# Patient Record
Sex: MALE | Race: BLACK OR AFRICAN AMERICAN | NOT HISPANIC OR LATINO | Employment: OTHER | URBAN - METROPOLITAN AREA
[De-identification: names, ages, dates, MRNs, and addresses within clinical notes are randomized per-mention and may not be internally consistent; named-entity substitution may affect disease eponyms.]

---

## 2020-02-05 DIAGNOSIS — M54.16 LUMBAR RADICULOPATHY: ICD-10-CM

## 2020-02-05 DIAGNOSIS — M54.12 CERVICAL RADICULOPATHY: Primary | ICD-10-CM

## 2020-03-09 ENCOUNTER — HOSPITAL ENCOUNTER (OUTPATIENT)
Dept: RADIOLOGY | Facility: HOSPITAL | Age: 75
Discharge: HOME | End: 2020-03-09
Attending: PHYSICIAN ASSISTANT
Payer: MEDICARE

## 2020-03-09 DIAGNOSIS — M54.12 CERVICAL RADICULOPATHY: ICD-10-CM

## 2020-03-09 DIAGNOSIS — M54.16 LUMBAR RADICULOPATHY: ICD-10-CM

## 2020-03-09 NOTE — PROGRESS NOTES
Pt reports having a penile implant but does not have a record with him to check compatible / [facility] contact [facility] and get [facility] medical infor and re-schedule MRI at later date

## 2020-03-09 NOTE — PROGRESS NOTES
Called pt at home and review MRI prep and reason for sedation / pt reports trouble laying flat and having sinus issues / advised NPO 4 hour / review meds and adult  present prior to test

## 2020-04-20 ENCOUNTER — HOSPITAL ENCOUNTER (OUTPATIENT)
Dept: RADIOLOGY | Facility: HOSPITAL | Age: 75
Discharge: HOME | End: 2020-04-20
Attending: PHYSICIAN ASSISTANT
Payer: MEDICARE

## 2020-06-11 NOTE — PROGRESS NOTES
Contacted patient, allergies reviewed, Pre/post MRI procedure instructions given to patient, patient understand to bring implant device ID card to MRI appointment. Medication/NPO status discussed,  Patient informed where to report accompanied by , answered patient questions, expressed understanding of given instructions.

## 2020-06-15 ENCOUNTER — HOSPITAL ENCOUNTER (OUTPATIENT)
Dept: RADIOLOGY | Facility: HOSPITAL | Age: 75
Discharge: HOME | End: 2020-06-15
Attending: PHYSICIAN ASSISTANT
Payer: COMMERCIAL

## 2020-06-15 VITALS
DIASTOLIC BLOOD PRESSURE: 74 MMHG | SYSTOLIC BLOOD PRESSURE: 169 MMHG | HEART RATE: 86 BPM | OXYGEN SATURATION: 100 % | RESPIRATION RATE: 20 BRPM

## 2020-06-15 RX ORDER — MIDAZOLAM HYDROCHLORIDE 1 MG/ML
2 INJECTION INTRAMUSCULAR; INTRAVENOUS ONCE
Status: DISCONTINUED | OUTPATIENT
Start: 2020-06-15 | End: 2020-06-16 | Stop reason: HOSPADM

## 2020-06-15 NOTE — PROGRESS NOTES
"Patient AAOX3, ambulatory with assistive walker, accompanied by daughter (Gris),  Verified 2 Pt. Identifier, pre-procedure instruction given to patient and patient's accompanied  (Daughter), patient states, (I use a BiPAP breathing machine at home, "My last MRI I was not able to lay down flat, I felt like I could not breath," "I have to be in an open MRI," instructed not to drive, make major decision, not to take sleep medication within 24 hours, expressed understanding,  verified allergies,  Midazolam 2mg will be  Given as ordered, patient will be monitored during  MRI exam.    "

## 2020-06-15 NOTE — PROGRESS NOTES
"Patient transferred to MRI scanning table, immediately requested to sit back up, patient, "I am not going to be able to lay down flat," "I have not been able to lay flat," patient refused to continue with the exam, "I'm not going to be able to do it." Instructed patient and daughter to communicate with his PCP for different course of actions. Patient states, "Yes, I will have to talk to my doctor." Patient D/C accompanied by daughter via W/C.   "

## 2023-04-26 ENCOUNTER — PROCEDURE VISIT (OUTPATIENT)
Dept: OPHTHALMOLOGY | Facility: CLINIC | Age: 78
End: 2023-04-26
Attending: OPHTHALMOLOGY
Payer: OTHER GOVERNMENT

## 2023-04-26 DIAGNOSIS — H25.813 MIXED TYPE AGE-RELATED CATARACT, BOTH EYES: ICD-10-CM

## 2023-04-26 DIAGNOSIS — H40.9 GLAUCOMA, UNSPECIFIED GLAUCOMA TYPE, UNSPECIFIED LATERALITY: Primary | ICD-10-CM

## 2023-04-26 DIAGNOSIS — E11.9 DIABETES MELLITUS TYPE 2 WITHOUT RETINOPATHY: ICD-10-CM

## 2023-04-26 PROCEDURE — 92004 PR EYE EXAM, NEW PATIENT,COMPREHESV: ICD-10-PCS | Mod: S$PBB,,, | Performed by: OPHTHALMOLOGY

## 2023-04-26 PROCEDURE — 92004 COMPRE OPH EXAM NEW PT 1/>: CPT | Mod: S$PBB,,, | Performed by: OPHTHALMOLOGY

## 2023-04-26 NOTE — PROGRESS NOTES
Subjective:       Patient ID: Dk Mcdaniels is a 78 y.o. male      Chief Complaint   Patient presents with    Referral     History of Present Illness  HPI    [facility]    CC: pt reports vision is blurry OU, getting a little worse over time.  Pt   reports that IOP has been high for about 20 years.  No pain.        Eye Meds: Liqu-Tears OU prn                      Alphagan OU  (TID)                     Cosopt OU ( BID)                     Latanaprost OU  (NIGHTLY)    POHx:   1. OHT OU  2. Glaucoma OU  3. H/o SLT OU per record review  Last edited by Cali Kenyon MD on 4/26/2023  5:50 PM.        Imaging:    See report    Assessment/Plan:     1. Glaucoma, unspecified glaucoma type, unspecified laterality  2. Mixed type age-related cataract, both eyes  [facility]  Refer to gl service next available  CPM with above gtts for now    3. Diabetes mellitus type 2 without retinopathy  BS control    Can return to retina clinic PRN    Follow up in about 3 weeks (around 5/17/2023), or if symptoms worsen or fail to improve, for Referral to glaucoma service.

## 2023-05-23 ENCOUNTER — OFFICE VISIT (OUTPATIENT)
Dept: OPHTHALMOLOGY | Facility: CLINIC | Age: 78
End: 2023-05-23
Payer: OTHER GOVERNMENT

## 2023-05-23 DIAGNOSIS — H40.1131 PRIMARY OPEN ANGLE GLAUCOMA (POAG) OF BOTH EYES, MILD STAGE: Primary | ICD-10-CM

## 2023-05-23 PROCEDURE — 99214 OFFICE O/P EST MOD 30 MIN: CPT | Mod: S$PBB,,, | Performed by: STUDENT IN AN ORGANIZED HEALTH CARE EDUCATION/TRAINING PROGRAM

## 2023-05-23 PROCEDURE — 92133 CPTRZD OPH DX IMG PST SGM ON: CPT | Mod: PBBFAC | Performed by: STUDENT IN AN ORGANIZED HEALTH CARE EDUCATION/TRAINING PROGRAM

## 2023-05-23 PROCEDURE — 92020 GONIOSCOPY: CPT | Mod: PBBFAC | Performed by: STUDENT IN AN ORGANIZED HEALTH CARE EDUCATION/TRAINING PROGRAM

## 2023-05-23 PROCEDURE — 76514 ECHO EXAM OF EYE THICKNESS: CPT | Mod: 26,S$PBB,, | Performed by: STUDENT IN AN ORGANIZED HEALTH CARE EDUCATION/TRAINING PROGRAM

## 2023-05-23 PROCEDURE — 92083 PR VISUAL FIELD EXAM,EXTENDED: ICD-10-PCS | Mod: 26,S$PBB,, | Performed by: STUDENT IN AN ORGANIZED HEALTH CARE EDUCATION/TRAINING PROGRAM

## 2023-05-23 PROCEDURE — 92020 PR SPECIAL EYE EVAL,GONIOSCOPY: ICD-10-PCS | Mod: S$PBB,,, | Performed by: STUDENT IN AN ORGANIZED HEALTH CARE EDUCATION/TRAINING PROGRAM

## 2023-05-23 PROCEDURE — 92083 EXTENDED VISUAL FIELD XM: CPT | Mod: 26,S$PBB,, | Performed by: STUDENT IN AN ORGANIZED HEALTH CARE EDUCATION/TRAINING PROGRAM

## 2023-05-23 PROCEDURE — 99999 PR PBB SHADOW E&M-EST. PATIENT-LVL III: ICD-10-PCS | Mod: PBBFAC,,, | Performed by: STUDENT IN AN ORGANIZED HEALTH CARE EDUCATION/TRAINING PROGRAM

## 2023-05-23 PROCEDURE — 99999 PR PBB SHADOW E&M-EST. PATIENT-LVL III: CPT | Mod: PBBFAC,,, | Performed by: STUDENT IN AN ORGANIZED HEALTH CARE EDUCATION/TRAINING PROGRAM

## 2023-05-23 PROCEDURE — 92083 EXTENDED VISUAL FIELD XM: CPT | Mod: PBBFAC | Performed by: STUDENT IN AN ORGANIZED HEALTH CARE EDUCATION/TRAINING PROGRAM

## 2023-05-23 PROCEDURE — 76514 PR  US, EYE, FOR CORNEAL THICKNESS: ICD-10-PCS | Mod: 26,S$PBB,, | Performed by: STUDENT IN AN ORGANIZED HEALTH CARE EDUCATION/TRAINING PROGRAM

## 2023-05-23 PROCEDURE — 99213 OFFICE O/P EST LOW 20 MIN: CPT | Mod: PBBFAC | Performed by: STUDENT IN AN ORGANIZED HEALTH CARE EDUCATION/TRAINING PROGRAM

## 2023-05-23 PROCEDURE — 92133 POSTERIOR SEGMENT OCT OPTIC NERVE(OCULAR COHERENCE TOMOGRAPHY) - OU - BOTH EYES: ICD-10-PCS | Mod: 26,S$PBB,, | Performed by: STUDENT IN AN ORGANIZED HEALTH CARE EDUCATION/TRAINING PROGRAM

## 2023-05-23 PROCEDURE — 76514 ECHO EXAM OF EYE THICKNESS: CPT | Mod: PBBFAC | Performed by: STUDENT IN AN ORGANIZED HEALTH CARE EDUCATION/TRAINING PROGRAM

## 2023-05-23 PROCEDURE — 99214 PR OFFICE/OUTPT VISIT, EST, LEVL IV, 30-39 MIN: ICD-10-PCS | Mod: S$PBB,,, | Performed by: STUDENT IN AN ORGANIZED HEALTH CARE EDUCATION/TRAINING PROGRAM

## 2023-05-23 PROCEDURE — 92020 GONIOSCOPY: CPT | Mod: S$PBB,,, | Performed by: STUDENT IN AN ORGANIZED HEALTH CARE EDUCATION/TRAINING PROGRAM

## 2023-05-23 NOTE — PROGRESS NOTES
Subjective:  HPI    77 yo male here for glaucoma eval     Ref. Dr. Kenoyn     C/o: Pt states his vision is foggy for distance.  Stops reading after a   while due to letters running together.     Meds: Alphagan TID OU              Cosopt BID OU              Latanoprost QHS OU      Last edited by Estella Torres MA on 5/23/2023 10:55 AM.      Exam:  Base Eye Exam       Visual Acuity (Snellen - Linear)         Right Left    Dist cc 20/40 20/40      Correction: Glasses              Tonometry (Applanation, 11:10 AM)         Right Left    Pressure 24 26              Pachymetry (5/23/2023)         Right Left    Thickness 563 577              Gonioscopy (Khushbu)         Right Left    Temporal SS SS    Nasal SS SS    Superior SS SS    Inferior SS, focal PAS SS, few PAS              Pupils         Pupils Dark Light Shape React APD    Right PERRL 4 3 Round Brisk None    Left PERRL 4 3 Round Brisk None              Visual Fields         Right Left     Full Full              Neuro/Psych       Oriented x3: Yes    Mood/Affect: Normal              Dilation       Both eyes: 1% Mydriacyl, 2.5% Phenylephrine @ 11:25AM                  Slit Lamp and Fundus Exam       External Exam         Right Left    External Normal Normal              Slit Lamp Exam         Right Left    Lids/Lashes Normal Normal    Conjunctiva/Sclera White and quiet White and quiet    Cornea Arcus Arcus    Anterior Chamber Deep and quiet Deep and quiet    Iris Round and reactive, dilates 5.5mm Round and reactive, dilates 5.5mm    Lens 2+ Nuclear sclerosis, 1+ Cortical cataract, 1+ Posterior subcapsular cataract 2+ Nuclear sclerosis, 1+ Cortical cataract, 1+ Posterior subcapsular cataract    Anterior Vitreous Posterior vitreous detachment Vitreous syneresis              Fundus Exam         Right Left    Disc Normal, full rim Normal, full rim    C/D Ratio 0.6 0.6    Macula Normal Normal, inferior myelinated NFL    Vessels Normal Normal    Periphery Normal Normal                   Refraction       Manifest Refraction         Sphere Cylinder Axis Dist [facility]    Right +0.75 +0.25 171 20/40    Left +0.50 +1.00 043 20/NI                  Assessment:  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 Per referral note, recent IOP OS 37. PMH hx prostate cancer, CML, HTN.  - Surg hx: none   - Laser hx: SLT x 2 OU  - Glaucoma FHx: none  -  / 577  - Gonio: open/ with inferior PAS OU (Estefani 5/2023)  - Tmax: unknown OD, 37 per outside records OS  - Target IOP: not yet determined  - Med adverse effects: n/a    Baseline HVF 5/2023 -- VFI 97/95  Baseline RNFL 5/2023 -- avg 79/85  Baseline photos pending    05/23/2023  IOP 24/26 on Cosopt 2/2, Brim 3/3, Xal 1/1  HVF: reliable, early SNS, VFI 97 OD  borderline reliable, nasal step, superior depressed points, VFI 95 OS -- baseline  OCT RNFL: blunted ST/IT peak, avg 79 OD  early IT thinning, avg 85 OS -- baseline    Cataracts, both eyes  Patient is interested in cataract surgery; interfering with activities of daily living. Visually significant cataract causing significant decrease in quality of life.  PAP 20/40 OU  - Guttae, PXF, or phacodonesis: none  - H/o LASIK/PRK, RK, or retinal surgery: none  - Dilation: poor (5.5mm)  - Target: plano  - Astigmatism: not yet determined  - Special needs: Trypan; No blood thinners    Plan:  OcHTN vs mild POAG OU. Thick pachymetry with IOP 24/26 on MMT. Proceed with phaco/IOL/OMNI superior 180/180 OS then OD.  - Continue Cosopt 2/2, Brim 3/3, Xal 1/1    Return for IOL calcs; Lenstar/Pentacam OU 1-2 weeks  Schedule phaco/IOL/OMNI OS     Bibi Jara MD  [facility], Glaucoma

## 2023-05-29 ENCOUNTER — TELEPHONE (OUTPATIENT)
Dept: OPHTHALMOLOGY | Facility: CLINIC | Age: 78
End: 2023-05-29
Payer: OTHER GOVERNMENT

## 2023-05-29 DIAGNOSIS — H25.813 MIXED TYPE AGE-RELATED CATARACT, BOTH EYES: Primary | ICD-10-CM

## 2023-06-23 ENCOUNTER — OFFICE VISIT (OUTPATIENT)
Dept: OPHTHALMOLOGY | Facility: CLINIC | Age: 78
End: 2023-06-23
Payer: OTHER GOVERNMENT

## 2023-06-23 DIAGNOSIS — H40.1131 PRIMARY OPEN ANGLE GLAUCOMA (POAG) OF BOTH EYES, MILD STAGE: ICD-10-CM

## 2023-06-23 DIAGNOSIS — H25.813 MIXED TYPE AGE-RELATED CATARACT, BOTH EYES: Primary | ICD-10-CM

## 2023-06-23 PROCEDURE — 99999 PR PBB SHADOW E&M-EST. PATIENT-LVL II: ICD-10-PCS | Mod: PBBFAC,,, | Performed by: STUDENT IN AN ORGANIZED HEALTH CARE EDUCATION/TRAINING PROGRAM

## 2023-06-23 PROCEDURE — 99213 OFFICE O/P EST LOW 20 MIN: CPT | Mod: S$PBB,,, | Performed by: STUDENT IN AN ORGANIZED HEALTH CARE EDUCATION/TRAINING PROGRAM

## 2023-06-23 PROCEDURE — 99999 PR PBB SHADOW E&M-EST. PATIENT-LVL II: CPT | Mod: PBBFAC,,, | Performed by: STUDENT IN AN ORGANIZED HEALTH CARE EDUCATION/TRAINING PROGRAM

## 2023-06-23 PROCEDURE — 99213 PR OFFICE/OUTPT VISIT, EST, LEVL III, 20-29 MIN: ICD-10-PCS | Mod: S$PBB,,, | Performed by: STUDENT IN AN ORGANIZED HEALTH CARE EDUCATION/TRAINING PROGRAM

## 2023-06-23 PROCEDURE — 99212 OFFICE O/P EST SF 10 MIN: CPT | Mod: PBBFAC | Performed by: STUDENT IN AN ORGANIZED HEALTH CARE EDUCATION/TRAINING PROGRAM

## 2023-06-23 RX ORDER — MOXIFLOXACIN 5 MG/ML
SOLUTION/ DROPS OPHTHALMIC
Qty: 3 ML | Refills: 0 | Status: SHIPPED | OUTPATIENT
Start: 2023-06-23 | End: 2023-06-23

## 2023-06-23 RX ORDER — MOXIFLOXACIN 5 MG/ML
SOLUTION/ DROPS OPHTHALMIC
Qty: 3 ML | Refills: 0 | OUTPATIENT
Start: 2023-06-23 | End: 2023-06-23

## 2023-06-23 RX ORDER — KETOROLAC TROMETHAMINE 5 MG/ML
1 SOLUTION OPHTHALMIC 4 TIMES DAILY
Qty: 5 ML | Refills: 1 | OUTPATIENT
Start: 2023-06-23 | End: 2023-06-23

## 2023-06-23 RX ORDER — KETOROLAC TROMETHAMINE 5 MG/ML
1 SOLUTION OPHTHALMIC 4 TIMES DAILY
Qty: 5 ML | Refills: 1 | Status: SHIPPED | OUTPATIENT
Start: 2023-06-23 | End: 2023-06-23

## 2023-06-23 RX ORDER — PREDNISOLONE ACETATE 10 MG/ML
1 SUSPENSION/ DROPS OPHTHALMIC 4 TIMES DAILY
Qty: 5 ML | Refills: 1 | OUTPATIENT
Start: 2023-06-23 | End: 2023-06-23

## 2023-06-23 RX ORDER — PREDNISOLONE ACETATE 10 MG/ML
1 SUSPENSION/ DROPS OPHTHALMIC 4 TIMES DAILY
Qty: 5 ML | Refills: 1 | OUTPATIENT
Start: 2023-06-23 | End: 2023-11-21

## 2023-06-23 RX ORDER — PREDNISOLONE ACETATE 10 MG/ML
1 SUSPENSION/ DROPS OPHTHALMIC 4 TIMES DAILY
Qty: 5 ML | Refills: 1 | Status: SHIPPED | OUTPATIENT
Start: 2023-06-23 | End: 2023-06-23

## 2023-06-23 RX ORDER — KETOROLAC TROMETHAMINE 5 MG/ML
1 SOLUTION OPHTHALMIC 4 TIMES DAILY
Qty: 5 ML | Refills: 1 | OUTPATIENT
Start: 2023-06-23

## 2023-06-23 RX ORDER — MOXIFLOXACIN 5 MG/ML
SOLUTION/ DROPS OPHTHALMIC
Qty: 3 ML | Refills: 0 | OUTPATIENT
Start: 2023-06-23

## 2023-06-23 NOTE — PROGRESS NOTES
Subjective:  HPI     Cataract     Additional comments: Pt here for IOL calculations and surgery scheduling   only. Pt seen in clinic 05/23/2023.           Comments    Pt here for IOL calculations and surgery scheduling only. Pt seen in   clinic 05/23/2023.    POHx:  1. 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 Cataracts, both eyes          Last edited by Jeanie Beth on 6/23/2023 11:14 AM.      Exam:  Base Eye Exam       Neuro/Psych       Oriented x3: Yes    Mood/Affect: Normal                  Slit Lamp and Fundus Exam       External Exam         Right Left    External Normal Normal              Slit Lamp Exam         Right Left    Lids/Lashes Normal Normal    Conjunctiva/Sclera White and quiet White and quiet    Cornea Arcus Arcus    Anterior Chamber Deep and quiet Deep and quiet    Iris Round and reactive, dilates 5.5mm Round and reactive, dilates 5.5mm    Lens 2+ Nuclear sclerosis, 1+ Cortical cataract, 1+ Posterior subcapsular cataract 2+ Nuclear sclerosis, 1+ Cortical cataract, 1+ Posterior subcapsular cataract    Anterior Vitreous Posterior vitreous detachment Vitreous syneresis              Fundus Exam         Right Left    Disc Normal, full rim Normal, full rim    C/D Ratio 0.6 0.6                  Assessment:  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 Per referral note, recent IOP OS 37. PMH hx prostate cancer, CML, HTN.  - Surg hx: none   - Laser hx: SLT x 2 OU  - Glaucoma FHx: none  -  / 577  - Gonio: open/ with inferior PAS OU (Coulon 5/2023)  - Tmax: unknown OD, 37 per outside records OS  - Target IOP: not yet determined  - Med adverse effects: n/a    Baseline HVF 5/2023 -- VFI 97/95  Baseline RNFL 5/2023 -- avg 79/85  Baseline photos pending    5/23/23 LCV  IOP 24/26 on Cosopt 2/2, Brim 3/3, Xal 1/1  HVF: reliable, early SNS, VFI 97 OD  borderline reliable, nasal step, superior depressed points, VFI 95 OS -- baseline  OCT RNFL:  blunted ST/IT peak, avg 79 OD  early IT thinning, avg 85 OS -- baseline    Cataracts, both eyes  Patient is interested in cataract surgery; interfering with activities of daily living. Visually significant cataract causing significant decrease in quality of life.  PAP 20/40 OU  - Guttae, PXF, or phacodonesis: none  - H/o LASIK/PRK, RK, or retinal surgery: none  - Dilation: poor (5.5mm)  - Target: plano  - Astigmatism: 1.4D OD, 1.92D OS; Pentacam with no astigmatism OD/irregular OS, discussed, declines toric; understands need for glasses OU  - Special needs: Trypan, Malyugin; No blood thinners  - Lens choice: OD DIBOO 19.5, MA60AC 19.5/18.5; OS DIBOO 19.5, MA60AC 19.5/18.5    Plan:  OcHTN vs mild POAG OU. Thick pachymetry with IOP 24/26 on MMT. Proceed with phaco/IOL/OMNI superior 180/180 OS then OD.  - Continue Cosopt 2/2, Brim 3/3, Xal 1/1    Return OR 7/20/23 -- phaco/IOL/OMNI superior 180/180 OS  Lens choices: DIBOO 19.5, MA60AC 19.5/18.5  Special needs: Trypan, Malyugin  [facility]    Bibi Jara MD  [facility], Glaucoma

## 2023-07-19 ENCOUNTER — TELEPHONE (OUTPATIENT)
Dept: OPHTHALMOLOGY | Facility: CLINIC | Age: 78
End: 2023-07-19
Payer: OTHER GOVERNMENT

## 2023-07-19 RX ORDER — LOSARTAN POTASSIUM 100 MG/1
1 TABLET ORAL DAILY
COMMUNITY
Start: 2023-03-13

## 2023-07-19 RX ORDER — ALLOPURINOL 100 MG/1
50 TABLET ORAL NIGHTLY
COMMUNITY
Start: 2023-03-13

## 2023-07-19 RX ORDER — SEMAGLUTIDE 1.34 MG/ML
1 INJECTION, SOLUTION SUBCUTANEOUS
COMMUNITY

## 2023-07-19 RX ORDER — SENNOSIDES 8.6 MG/1
8.6 TABLET ORAL
COMMUNITY
Start: 2023-06-07

## 2023-07-19 RX ORDER — CARVEDILOL 25 MG/1
1 TABLET ORAL 2 TIMES DAILY
COMMUNITY
Start: 2023-03-13

## 2023-07-19 RX ORDER — HYDRALAZINE HYDROCHLORIDE 50 MG/1
50 TABLET, FILM COATED ORAL EVERY 12 HOURS
COMMUNITY
Start: 2022-09-29

## 2023-07-19 RX ORDER — CHLORTHALIDONE 25 MG/1
25 TABLET ORAL DAILY
COMMUNITY
Start: 2023-03-13

## 2023-07-19 RX ORDER — LANOLIN ALCOHOL/MO/W.PET/CERES
400 CREAM (GRAM) TOPICAL
COMMUNITY
Start: 2023-03-13

## 2023-07-19 RX ORDER — GABAPENTIN 300 MG/1
1 CAPSULE ORAL 2 TIMES DAILY
COMMUNITY
Start: 2023-03-13

## 2023-07-19 NOTE — PRE-PROCEDURE INSTRUCTIONS
PreOp Instructions given:   - Verbal medication information (what to hold and what to take)   - NPO guidelines   - Arrival place directions given; time to be given the day before procedure by the   Surgeon's Office [address] [facility] [place]  - Bathing with antibacterial soap   - Don't wear any jewelry or bring any valuables AM of surgery   - No makeup or moisturizer to face   - No perfume/cologne, powder, lotions or aftershave   Pt. verbalized understanding.   Pt denies any h/o Anesthesia/Sedation complications or side effects.

## 2023-07-20 ENCOUNTER — ANESTHESIA (OUTPATIENT)
Dept: SURGERY | Facility: HOSPITAL | Age: 78
End: 2023-07-20
Payer: OTHER GOVERNMENT

## 2023-07-20 ENCOUNTER — HOSPITAL ENCOUNTER (OUTPATIENT)
Facility: HOSPITAL | Age: 78
Discharge: HOME | End: 2023-07-20
Attending: STUDENT IN AN ORGANIZED HEALTH CARE EDUCATION/TRAINING PROGRAM | Admitting: STUDENT IN AN ORGANIZED HEALTH CARE EDUCATION/TRAINING PROGRAM
Payer: OTHER GOVERNMENT

## 2023-07-20 ENCOUNTER — ANESTHESIA EVENT (OUTPATIENT)
Dept: SURGERY | Facility: HOSPITAL | Age: 78
End: 2023-07-20
Payer: OTHER GOVERNMENT

## 2023-07-20 VITALS
RESPIRATION RATE: 17 BRPM | HEART RATE: 80 BPM | SYSTOLIC BLOOD PRESSURE: 110 MMHG | WEIGHT: 229 LBS | OXYGEN SATURATION: 99 % | TEMPERATURE: 98 F | DIASTOLIC BLOOD PRESSURE: 64 MMHG | BODY MASS INDEX: 36.96 KG/M2

## 2023-07-20 DIAGNOSIS — H40.1131 PRIMARY OPEN ANGLE GLAUCOMA (POAG) OF BOTH EYES, MILD STAGE: ICD-10-CM

## 2023-07-20 DIAGNOSIS — H25.811 COMBINED FORMS OF AGE-RELATED CATARACT OF RIGHT EYE: ICD-10-CM

## 2023-07-20 DIAGNOSIS — H25.812 COMBINED FORM OF AGE-RELATED CATARACT, LEFT EYE: Primary | ICD-10-CM

## 2023-07-20 DIAGNOSIS — H25.813 MIXED TYPE AGE-RELATED CATARACT, BOTH EYES: ICD-10-CM

## 2023-07-20 LAB
POCT GLUCOSE: 109 MG/DL (ref 70–110)
POCT GLUCOSE: 114 MG/DL (ref 70–110)

## 2023-07-20 PROCEDURE — 71000044 HC DOSC ROUTINE RECOVERY FIRST HOUR: Performed by: STUDENT IN AN ORGANIZED HEALTH CARE EDUCATION/TRAINING PROGRAM

## 2023-07-20 PROCEDURE — 66174 TRLUML DIL AQ O/F CAN W/O ST: CPT | Mod: 53,51,LT, | Performed by: STUDENT IN AN ORGANIZED HEALTH CARE EDUCATION/TRAINING PROGRAM

## 2023-07-20 PROCEDURE — 36000708 HC OR TIME LEV III 1ST 15 MIN: Performed by: STUDENT IN AN ORGANIZED HEALTH CARE EDUCATION/TRAINING PROGRAM

## 2023-07-20 PROCEDURE — 25000003 PHARM REV CODE 250: Performed by: STUDENT IN AN ORGANIZED HEALTH CARE EDUCATION/TRAINING PROGRAM

## 2023-07-20 PROCEDURE — 82962 GLUCOSE BLOOD TEST: CPT | Performed by: STUDENT IN AN ORGANIZED HEALTH CARE EDUCATION/TRAINING PROGRAM

## 2023-07-20 PROCEDURE — 66982 PR REMOVAL, CATARACT, W/INSRT INTRAOC LENS, W/O ENDO CYCLO, CMPLX: ICD-10-PCS | Mod: LT,,, | Performed by: STUDENT IN AN ORGANIZED HEALTH CARE EDUCATION/TRAINING PROGRAM

## 2023-07-20 PROCEDURE — V2632 POST CHMBR INTRAOCULAR LENS: HCPCS | Performed by: STUDENT IN AN ORGANIZED HEALTH CARE EDUCATION/TRAINING PROGRAM

## 2023-07-20 PROCEDURE — 65820 GONIOTOMY: CPT | Mod: 59,LT,, | Performed by: STUDENT IN AN ORGANIZED HEALTH CARE EDUCATION/TRAINING PROGRAM

## 2023-07-20 PROCEDURE — 66982 XCAPSL CTRC RMVL CPLX WO ECP: CPT | Mod: LT,,, | Performed by: STUDENT IN AN ORGANIZED HEALTH CARE EDUCATION/TRAINING PROGRAM

## 2023-07-20 PROCEDURE — 37000008 HC ANESTHESIA 1ST 15 MINUTES: Performed by: STUDENT IN AN ORGANIZED HEALTH CARE EDUCATION/TRAINING PROGRAM

## 2023-07-20 PROCEDURE — D9220A PRA ANESTHESIA: Mod: ANES,,, | Performed by: ANESTHESIOLOGY

## 2023-07-20 PROCEDURE — D9220A PRA ANESTHESIA: Mod: CRNA,,, | Performed by: NURSE ANESTHETIST, CERTIFIED REGISTERED

## 2023-07-20 PROCEDURE — 25000003 PHARM REV CODE 250: Performed by: NURSE ANESTHETIST, CERTIFIED REGISTERED

## 2023-07-20 PROCEDURE — D9220A PRA ANESTHESIA: ICD-10-PCS | Mod: CRNA,,, | Performed by: NURSE ANESTHETIST, CERTIFIED REGISTERED

## 2023-07-20 PROCEDURE — 65820 PR RELIEVE INNER EYE PRESSURE: ICD-10-PCS | Mod: 59,LT,, | Performed by: STUDENT IN AN ORGANIZED HEALTH CARE EDUCATION/TRAINING PROGRAM

## 2023-07-20 PROCEDURE — 66174 PR TRANSLUMINAL DILATION AQUEOUS CANAL, W/O RETENTION DEVICE/STENT: ICD-10-PCS | Mod: 53,51,LT, | Performed by: STUDENT IN AN ORGANIZED HEALTH CARE EDUCATION/TRAINING PROGRAM

## 2023-07-20 PROCEDURE — 37000009 HC ANESTHESIA EA ADD 15 MINS: Performed by: STUDENT IN AN ORGANIZED HEALTH CARE EDUCATION/TRAINING PROGRAM

## 2023-07-20 PROCEDURE — D9220A PRA ANESTHESIA: ICD-10-PCS | Mod: ANES,,, | Performed by: ANESTHESIOLOGY

## 2023-07-20 PROCEDURE — 36000709 HC OR TIME LEV III EA ADD 15 MIN: Performed by: STUDENT IN AN ORGANIZED HEALTH CARE EDUCATION/TRAINING PROGRAM

## 2023-07-20 PROCEDURE — 71000015 HC POSTOP RECOV 1ST HR: Performed by: STUDENT IN AN ORGANIZED HEALTH CARE EDUCATION/TRAINING PROGRAM

## 2023-07-20 PROCEDURE — C1876 STENT, NON-COA/NON-COV W/DEL: HCPCS | Performed by: STUDENT IN AN ORGANIZED HEALTH CARE EDUCATION/TRAINING PROGRAM

## 2023-07-20 PROCEDURE — 63600175 PHARM REV CODE 636 W HCPCS: Performed by: NURSE ANESTHETIST, CERTIFIED REGISTERED

## 2023-07-20 PROCEDURE — 25000003 PHARM REV CODE 250

## 2023-07-20 DEVICE — LENS EYHANCE +19.5D: Type: IMPLANTABLE DEVICE | Site: EYE | Status: FUNCTIONAL

## 2023-07-20 RX ORDER — LIDOCAINE HYDROCHLORIDE 40 MG/ML
INJECTION, SOLUTION RETROBULBAR
Status: DISCONTINUED
Start: 2023-07-20 | End: 2023-07-20 | Stop reason: HOSPADM

## 2023-07-20 RX ORDER — ONDANSETRON 2 MG/ML
4 INJECTION INTRAMUSCULAR; INTRAVENOUS DAILY PRN
Status: DISCONTINUED | OUTPATIENT
Start: 2023-07-20 | End: 2023-07-20 | Stop reason: HOSPADM

## 2023-07-20 RX ORDER — MIDAZOLAM HYDROCHLORIDE 1 MG/ML
INJECTION, SOLUTION INTRAMUSCULAR; INTRAVENOUS
Status: DISCONTINUED | OUTPATIENT
Start: 2023-07-20 | End: 2023-07-20

## 2023-07-20 RX ORDER — HALOPERIDOL 5 MG/ML
0.5 INJECTION INTRAMUSCULAR EVERY 10 MIN PRN
Status: DISCONTINUED | OUTPATIENT
Start: 2023-07-20 | End: 2023-07-20 | Stop reason: HOSPADM

## 2023-07-20 RX ORDER — SODIUM CHLORIDE 0.9 % (FLUSH) 0.9 %
2 SYRINGE (ML) INJECTION
Status: DISCONTINUED | OUTPATIENT
Start: 2023-07-20 | End: 2023-07-20 | Stop reason: HOSPADM

## 2023-07-20 RX ORDER — MOXIFLOXACIN 5 MG/ML
SOLUTION/ DROPS OPHTHALMIC
Status: COMPLETED
Start: 2023-07-20 | End: 2023-07-20

## 2023-07-20 RX ORDER — PREDNISOLONE ACETATE 10 MG/ML
1 SUSPENSION/ DROPS OPHTHALMIC
Status: DISCONTINUED | OUTPATIENT
Start: 2023-07-20 | End: 2023-07-20 | Stop reason: HOSPADM

## 2023-07-20 RX ORDER — OXYCODONE AND ACETAMINOPHEN 5; 325 MG/1; MG/1
1 TABLET ORAL
Status: DISCONTINUED | OUTPATIENT
Start: 2023-07-20 | End: 2023-07-20 | Stop reason: HOSPADM

## 2023-07-20 RX ORDER — PREDNISOLONE ACETATE 10 MG/ML
SUSPENSION/ DROPS OPHTHALMIC
Status: COMPLETED
Start: 2023-07-20 | End: 2023-07-20

## 2023-07-20 RX ORDER — FENTANYL CITRATE 50 UG/ML
INJECTION, SOLUTION INTRAMUSCULAR; INTRAVENOUS
Status: DISCONTINUED | OUTPATIENT
Start: 2023-07-20 | End: 2023-07-20

## 2023-07-20 RX ORDER — SODIUM CHLORIDE 0.9 % (FLUSH) 0.9 %
3 SYRINGE (ML) INJECTION
Status: DISCONTINUED | OUTPATIENT
Start: 2023-07-20 | End: 2023-07-20 | Stop reason: HOSPADM

## 2023-07-20 RX ORDER — LIDOCAINE HYDROCHLORIDE 10 MG/ML
INJECTION, SOLUTION EPIDURAL; INFILTRATION; INTRACAUDAL; PERINEURAL
Status: DISCONTINUED
Start: 2023-07-20 | End: 2023-07-20 | Stop reason: HOSPADM

## 2023-07-20 RX ORDER — MOXIFLOXACIN 5 MG/ML
1 SOLUTION/ DROPS OPHTHALMIC
Status: DISCONTINUED | OUTPATIENT
Start: 2023-07-20 | End: 2023-07-20 | Stop reason: HOSPADM

## 2023-07-20 RX ORDER — CYCLOP/TROP/PROPA/PHEN/KET/WAT 1-1-0.1%
1 DROPS (EA) OPHTHALMIC (EYE)
Status: DISCONTINUED | OUTPATIENT
Start: 2023-07-20 | End: 2023-07-20 | Stop reason: HOSPADM

## 2023-07-20 RX ORDER — LIDOCAINE HYDROCHLORIDE 40 MG/ML
INJECTION, SOLUTION RETROBULBAR
Status: DISCONTINUED | OUTPATIENT
Start: 2023-07-20 | End: 2023-07-20 | Stop reason: HOSPADM

## 2023-07-20 RX ORDER — DEXAMETHASONE SODIUM PHOSPHATE 4 MG/ML
INJECTION, SOLUTION INTRA-ARTICULAR; INTRALESIONAL; INTRAMUSCULAR; INTRAVENOUS; SOFT TISSUE
Status: DISCONTINUED
Start: 2023-07-20 | End: 2023-07-20 | Stop reason: HOSPADM

## 2023-07-20 RX ORDER — HYDROMORPHONE HYDROCHLORIDE 1 MG/ML
0.2 INJECTION, SOLUTION INTRAMUSCULAR; INTRAVENOUS; SUBCUTANEOUS EVERY 5 MIN PRN
Status: DISCONTINUED | OUTPATIENT
Start: 2023-07-20 | End: 2023-07-20 | Stop reason: HOSPADM

## 2023-07-20 RX ORDER — CEFAZOLIN SODIUM 500 MG/2.2ML
INJECTION, POWDER, FOR SOLUTION INTRAMUSCULAR; INTRAVENOUS
Status: DISCONTINUED
Start: 2023-07-20 | End: 2023-07-20 | Stop reason: HOSPADM

## 2023-07-20 RX ORDER — LIDOCAINE HYDROCHLORIDE 10 MG/ML
INJECTION, SOLUTION EPIDURAL; INFILTRATION; INTRACAUDAL; PERINEURAL
Status: DISCONTINUED | OUTPATIENT
Start: 2023-07-20 | End: 2023-07-20 | Stop reason: HOSPADM

## 2023-07-20 RX ADMIN — MOXIFLOXACIN OPHTHALMIC 1 DROP: 5 SOLUTION/ DROPS OPHTHALMIC at 06:07

## 2023-07-20 RX ADMIN — Medication 1 DROP: at 06:07

## 2023-07-20 RX ADMIN — MIDAZOLAM HYDROCHLORIDE 2 MG: 1 INJECTION, SOLUTION INTRAMUSCULAR; INTRAVENOUS at 07:07

## 2023-07-20 RX ADMIN — Medication 1 DROP: at 07:07

## 2023-07-20 RX ADMIN — FENTANYL CITRATE 25 MCG: 50 INJECTION, SOLUTION INTRAMUSCULAR; INTRAVENOUS at 07:07

## 2023-07-20 RX ADMIN — PREDNISOLONE ACETATE 1 DROP: 10 SUSPENSION/ DROPS OPHTHALMIC at 06:07

## 2023-07-20 RX ADMIN — MOXIFLOXACIN OPHTHALMIC 1 DROP: 5 SOLUTION/ DROPS OPHTHALMIC at 07:07

## 2023-07-20 RX ADMIN — PREDNISOLONE ACETATE 1 DROP: 10 SUSPENSION/ DROPS OPHTHALMIC at 07:07

## 2023-07-20 RX ADMIN — SODIUM CHLORIDE: 0.9 INJECTION, SOLUTION INTRAVENOUS at 06:07

## 2023-07-20 NOTE — INTERVAL H&P NOTE
BRIEF HISTORY, PERTINENT EXAM:  H&P reviewed. No changes.    ASSESSMENT/PLAN:  Proceed with surgery as planned -- phaco/IOL/possible OMNI vs KDB OS    Bibi Jara MD

## 2023-07-20 NOTE — DISCHARGE SUMMARY
[facility] - [facility] (1st Fl)  Discharge Note  Short Stay    Procedure(s) (LRB):  PHACOEMULSIFICATION, CATARACT (Left)  INSERTION, IOL PROSTHESIS (Left)  GONIOTOMY (Left)    OUTCOME: Patient tolerated treatment/procedure well without complication and is now ready for discharge.    DISPOSITION: Home or Self Care    FINAL DIAGNOSIS:  <principal problem not specified>    FOLLOWUP: In clinic    DISCHARGE INSTRUCTIONS:  No discharge procedures on file.     TIME SPENT ON DISCHARGE: 5 minutes

## 2023-07-20 NOTE — ANESTHESIA POSTPROCEDURE EVALUATION
Anesthesia Post Evaluation    Patient: Dk Mcdaniels    Procedure(s) Performed: Procedure(s) (LRB):  PHACOEMULSIFICATION, CATARACT (Left)  INSERTION, IOL PROSTHESIS (Left)  GONIOTOMY (Left)    Final Anesthesia Type: MAC      Patient location during evaluation: PACU  Patient participation: Yes- Able to Participate  Level of consciousness: awake and alert and oriented  Post-procedure vital signs: reviewed and stable  Pain management: adequate  Airway patency: patent    PONV status at discharge: No PONV  Anesthetic complications: no      Cardiovascular status: stable  Respiratory status: unassisted, spontaneous ventilation and room air  Hydration status: euvolemic  Follow-up not needed.          Vitals Value Taken Time   /64 07/20/23 0901   Temp 36.6 °C (97.9 °F) 07/20/23 0900   Pulse 84 07/20/23 0903   Resp 0 07/20/23 0903   SpO2 100 % 07/20/23 0903   Vitals shown include unvalidated device data.      No case tracking events are documented in the log.      Pain/Cory Score: Cory Score: 10 (7/20/2023  8:15 AM)

## 2023-07-20 NOTE — ANESTHESIA PREPROCEDURE EVALUATION
"                                                                                                             07/20/2023  Dk Mcdaniels is a 78 y.o., male.    Procedures:        PHACOEMULSIFICATION, CATARACT (Left: Eye)       INSERTION, IOL PROSTHESIS (Left: Eye)       GONIOTOMY (Left: Eye)   Anesthesia type: Local MAC   Diagnosis: Mixed type age-related cataract, both eyes [H25.813]         Pre-operative evaluation for Procedure(s) (LRB):  PHACOEMULSIFICATION, CATARACT (Left)  INSERTION, IOL PROSTHESIS (Left)  GONIOTOMY (Left)    @[id number]@@    No diagnosis found.    Review of patient's allergies indicates:   Allergen Reactions    Metoprolol Shortness Of Breath and Other (See Comments)     Chest pains    Lisinopril Other (See Comments)     Coughing a lot after taking Lisinopril Med    Pcn [penicillins] Itching     " i break out from head to toe "       Medications Prior to Admission   Medication Sig Dispense Refill Last Dose    allopurinoL (ZYLOPRIM) 100 MG tablet Take 50 mg by mouth every evening.       amlodipine (NORVASC) 10 MG tablet Take 10 mg by mouth once daily.   7/19/2023    carvediloL (COREG) 25 MG tablet Take 1 tablet by mouth 2 (two) times daily.   7/19/2023    chlorthalidone (HYGROTEN) 25 MG Tab Take 25 mg by mouth once daily.       empagliflozin (JARDIANCE) 25 mg tablet Take 1 tablet by mouth every morning.   7/19/2023    gabapentin (NEURONTIN) 300 MG capsule Take 1 capsule by mouth 2 (two) times daily. Takes 300 mg every AM and 600 mg every PM   7/19/2023    hydrALAZINE (APRESOLINE) 50 MG tablet Take 50 mg by mouth every 12 (twelve) hours.       losartan (COZAAR) 100 MG tablet Take 1 tablet by mouth once daily.       magnesium oxide (MAG-OX) 400 mg (241.3 mg magnesium) tablet 400 mg.       omeprazole (PRILOSEC) 20 MG capsule Take 20 mg by mouth once daily.   7/19/2023    senna (SENOKOT) 8.6 mg tablet 8.6 mg.       aspirin (ECOTRIN) 81 MG EC tablet Take 81 mg by mouth once daily.       " brimonidine 0.2% (ALPHAGAN) 0.2 % Drop 1 drop every 8 (eight) hours.       dorzolamide (TRUSOPT) 2 % ophthalmic solution 1 drop 3 (three) times daily.       FOLIC ACID/MULTIVITS-MIN/LUT (CENTRUM SILVER ORAL) Take by mouth.       imatinib (GLEEVEC) 100 MG Tab Take by mouth once daily.       ketorolac 0.5% (ACULAR) 0.5 % Drop Place 1 drop into the left eye 4 (four) times daily. 5 mL 1     loratadine (CLARITIN) 10 mg tablet Take 10 mg by mouth once daily.       methocarbamol (ROBAXIN) 500 MG Tab Take 500 mg by mouth 4 (four) times daily.       moxifloxacin (VIGAMOX) 0.5 % ophthalmic solution 1 drop 4 times daily in the left eye for 10 total days after surgery. 3 mL 0     naproxen (NAPROSYN) 500 MG tablet Take 500 mg by mouth 2 (two) times daily with meals.       prednisoLONE acetate (PRED FORTE) 1 % DrpS Place 1 drop into the left eye 4 (four) times daily. 5 mL 1     semaglutide (OZEMPIC) 1 mg/dose (2 mg/1.5 mL) PnIj Inject 1 mg into the skin every 7 days.               No current facility-administered medications on file prior to encounter.     Current Outpatient Medications on File Prior to Encounter   Medication Sig Dispense Refill    allopurinoL (ZYLOPRIM) 100 MG tablet Take 50 mg by mouth every evening.      amlodipine (NORVASC) 10 MG tablet Take 10 mg by mouth once daily.      carvediloL (COREG) 25 MG tablet Take 1 tablet by mouth 2 (two) times daily.      chlorthalidone (HYGROTEN) 25 MG Tab Take 25 mg by mouth once daily.      empagliflozin (JARDIANCE) 25 mg tablet Take 1 tablet by mouth every morning.      gabapentin (NEURONTIN) 300 MG capsule Take 1 capsule by mouth 2 (two) times daily. Takes 300 mg every AM and 600 mg every PM      hydrALAZINE (APRESOLINE) 50 MG tablet Take 50 mg by mouth every 12 (twelve) hours.      losartan (COZAAR) 100 MG tablet Take 1 tablet by mouth once daily.      magnesium oxide (MAG-OX) 400 mg (241.3 mg magnesium) tablet 400 mg.      omeprazole (PRILOSEC) 20 MG  capsule Take 20 mg by mouth once daily.      senna (SENOKOT) 8.6 mg tablet 8.6 mg.      aspirin (ECOTRIN) 81 MG EC tablet Take 81 mg by mouth once daily.      brimonidine 0.2% (ALPHAGAN) 0.2 % Drop 1 drop every 8 (eight) hours.      dorzolamide (TRUSOPT) 2 % ophthalmic solution 1 drop 3 (three) times daily.      FOLIC ACID/MULTIVITS-MIN/LUT (CENTRUM SILVER ORAL) Take by mouth.      imatinib (GLEEVEC) 100 MG Tab Take by mouth once daily.      loratadine (CLARITIN) 10 mg tablet Take 10 mg by mouth once daily.      methocarbamol (ROBAXIN) 500 MG Tab Take 500 mg by mouth 4 (four) times daily.      naproxen (NAPROSYN) 500 MG tablet Take 500 mg by mouth 2 (two) times daily with meals.      semaglutide (OZEMPIC) 1 mg/dose (2 mg/1.5 mL) PnIj Inject 1 mg into the skin every 7 days.         Past Medical History:  No date: Diabetes mellitus  No date: Hypertension  No date: Prostate cancer    Past Surgical History:   Procedure Laterality Date    HERNIA REPAIR      KNEE ARTHROSCOPY W/ MENISCAL REPAIR Right     PENILE PROSTHESIS IMPLANT      PROSTATECTOMY         Social History     Tobacco Use   Smoking Status Never   Smokeless Tobacco Not on file       Social History     Substance and Sexual Activity   Alcohol Use No       Physical Activity: Not on file         No results for input(s): HCT in the last 72 hours.  No results for input(s): PLT in the last 72 hours.  No results for input(s): K in the last 72 hours.  No results for input(s): CREATININE in the last 72 hours.  No results for input(s): GLU in the last 72 hours.  No results for input(s): PT in the last 72 hours.                    Pre-op Assessment          Review of Systems  Anesthesia Hx:  No problems with previous Anesthesia    Hematology/Oncology:  Hematology Normal       -- Cancer in past history:  Oncology Comments: Prostate 2203, TURP, no radiation, or chemo.    Leukemia diagnosed 2006, no sequelae in treatment     Cardiovascular:   Hypertension,  well controlled Denies MI.    Denies Angina.  Denies Orthopnea.  Denies PND.    Pulmonary:   Denies COPD.  Denies Asthma. Shortness of breath Sleep Apnea, CPAP Half to one block SOB,     Renal/:   Denies Chronic Renal Disease.     Hepatic/GI:   Denies Liver Disease.    Neurological:   Denies TIA. Denies CVA. Denies Seizures.    Endocrine:   Diabetes, type 2        Physical Exam  General: Well nourished, Cooperative, Alert and Oriented    Airway:  Mallampati: III   Mouth Opening: Normal  TM Distance: Normal  Tongue: Normal  Neck ROM: Normal ROM        Anesthesia Plan  Type of Anesthesia, risks & benefits discussed:    Anesthesia Type: MAC  Intra-op Monitoring Plan: Standard ASA Monitors  Post Op Pain Control Plan: IV/PO Opioids PRN  Induction:  IV  Informed Consent: Informed consent signed with the Patient and all parties understand the risks and agree with anesthesia plan.  All questions answered.   ASA Score: 3  Day of Surgery Review of History & Physical: H&P Update referred to the surgeon/provider.    Ready For Surgery From Anesthesia Perspective.     .

## 2023-07-20 NOTE — OP NOTE
DATE OF SURGERY: 7/20/2023    PREOPERATIVE DIAGNOSES:  1. Visually significant combined form cataract, left eye.  2. Primary open angle glaucoma, mild stage, left eye.    POSTOPERATIVE DIAGNOSES:  Same    PROCEDURES PERFORMED:  Cataract extraction with intraocular lens implant, left eye  Goniotomy, 4 clock hours, left eye    ATTENDING SURGEON:  Bibi Jara M.D.    ANESTHESIA:  MAC with topical    COMPLICATIONS:  None.    IMPLANTS:   Implant Name Type Inv. Item Serial No.  Lot No. LRB No. Used Action   LENS EYHANCE +19.5D - [id number]  LENS EYHANCE +19.5D [id number] [employer]  Left 1 Implanted         JUSTIFICATION OF SURGERY:     Dk Mcdaniels is a 78 y.o. male with a history and physical exam consistent with a visually significant cataract. We discussed his medical conditions and treatment options, including the risks, benefits, and alternatives of surgery. he expressed his understanding of the risks, benefits, and alternatives to the procedure including, but not limited to infection, bleeding, loss of vision, loss of eye, corneal edema, need for glasses, and need for further surgical intervention. After answering all his questions, there was an understanding of the issues involved with surgery and the consent was signed.    PROCEDURE:  Local anesthesia  Betadine paint and drop in holding room   Prep and drape in usual manner in OR  Time out according to protocol  Temporal lid speculum placed  Paracentesis made with sideport blade  Lidocaine injected. Trypan injected and rinsed.  Viscoelastic injected  Clear corneal incision made with 2.5 mm keratome blade  Continuous curvilinear capsulorrhexis created using a prebent cystotome and Utrata forceps  Gentle hydrodissection until nucleus was mobile  Phacoemulsification tip used to disassemble the lens and remove it  Removal of remaining cortical material and epinuclear shell with the irrigation and aspiration handpiece  Capsular bag  inflated with Provisc  Intraocular lens injected into the capsular bag and centered  Healon was placed in the anterior chamber and on the cornea. The patient's head was then rotated and the microscope tilted away from the surgeon so as to visualize the nasal trabecular meshwork with the goniolens. Blood in Schlemm's canal was visible in the entire nasal angle prior to any manipulation. The OMNI was placed in the nasal trabecular meshwork facing the superior angle. The catheter was inserted into Schlemm's canal but the patient was uncomfortable to it was retracted. Blood reflux noted upon removing. Decision made to do a 4 clock hour goniotomy with 25g MVR given difficulty with visualization of the catheter due to significant blood.  Viscoelastic removed with the irrigation and aspiration handpiece  Both wounds hydrated, wounds checked and found to be watertight.  Subconjunctival injection of antibiotics and steroids  Speculum removed from the eye.  Anti-inflammatory and antibiotic drops instilled into the eye  Patch and plastic shield placed on the eye    The patient tolerated the procedure well. There were no complications and the patient left the operating room in good condition. Arrangements were made for the patient to be seen in the outpatient clinic on the first postoperative day.

## 2023-07-20 NOTE — PLAN OF CARE
Discharge instructions reviewed with patient and daughter at bedside. Verbalized understanding. Packet given.

## 2023-07-20 NOTE — PATIENT INSTRUCTIONS
POST-OPERATIVE CATARACT DROP INSTRUCTIONS    Do not start any post operative drops in the operative eye today. Please keep the shield on at all times until you see Dr. Jara.  Please bring all of your drops with you to your post-op appointment.     ---------------------------------------------------------------------------------------    Continue all drops in the other eye as before.    No heavy lifting, bending or straining for 10 days  Do not rub your eyes for 1 month  Do not get water in your eyes for 1 week  No swimming for 1 month  Please sleep with the shield over your eye for the next week to protect your eye during sleep    If you experience any increasing redness, sensitivity to light, pain, or changes in vision, please call the office immediately.    Bibi Jara MD  Office number: [phone]

## 2023-07-20 NOTE — TRANSFER OF CARE
Anesthesia Transfer of Care Note    Patient: Dk Mcdaniels    Procedure(s) Performed: Procedure(s) (LRB):  PHACOEMULSIFICATION, CATARACT (Left)  INSERTION, IOL PROSTHESIS (Left)  GONIOTOMY (Left)    Patient location: PACU    Anesthesia Type: MAC    Transport from OR: Transported from OR on room air with adequate spontaneous ventilation    Post pain: adequate analgesia    Post assessment: tolerated procedure well and no apparent anesthetic complications    Post vital signs: stable    Level of consciousness: awake, alert and oriented    Nausea/Vomiting: no nausea/vomiting    Complications: none    Transfer of care protocol was followed      Last vitals:   Visit Vitals  BP (!) 94/55 (BP Location: Left arm, Patient Position: Lying)   Pulse 87   Temp 36.6 °C (97.9 °F) (Temporal)   Resp 19   Wt 103.9 kg (229 lb)   SpO2 100%   BMI 36.96 kg/m²

## 2023-07-21 ENCOUNTER — OFFICE VISIT (OUTPATIENT)
Dept: OPHTHALMOLOGY | Facility: CLINIC | Age: 78
End: 2023-07-21
Payer: OTHER GOVERNMENT

## 2023-07-21 DIAGNOSIS — H25.811 COMBINED FORM OF AGE-RELATED CATARACT, RIGHT EYE: ICD-10-CM

## 2023-07-21 DIAGNOSIS — Z98.42 STATUS POST CATARACT EXTRACTION AND INSERTION OF INTRAOCULAR LENS, LEFT: Primary | ICD-10-CM

## 2023-07-21 DIAGNOSIS — H40.1131 PRIMARY OPEN ANGLE GLAUCOMA (POAG) OF BOTH EYES, MILD STAGE: ICD-10-CM

## 2023-07-21 DIAGNOSIS — Z96.1 STATUS POST CATARACT EXTRACTION AND INSERTION OF INTRAOCULAR LENS, LEFT: Primary | ICD-10-CM

## 2023-07-21 PROCEDURE — 99999 PR PBB SHADOW E&M-EST. PATIENT-LVL IV: CPT | Mod: PBBFAC,,, | Performed by: STUDENT IN AN ORGANIZED HEALTH CARE EDUCATION/TRAINING PROGRAM

## 2023-07-21 PROCEDURE — 99999 PR PBB SHADOW E&M-EST. PATIENT-LVL IV: ICD-10-PCS | Mod: PBBFAC,,, | Performed by: STUDENT IN AN ORGANIZED HEALTH CARE EDUCATION/TRAINING PROGRAM

## 2023-07-21 PROCEDURE — 99024 PR POST-OP FOLLOW-UP VISIT: ICD-10-PCS | Mod: ,,, | Performed by: STUDENT IN AN ORGANIZED HEALTH CARE EDUCATION/TRAINING PROGRAM

## 2023-07-21 PROCEDURE — 99214 OFFICE O/P EST MOD 30 MIN: CPT | Mod: PBBFAC | Performed by: STUDENT IN AN ORGANIZED HEALTH CARE EDUCATION/TRAINING PROGRAM

## 2023-07-21 PROCEDURE — 99024 POSTOP FOLLOW-UP VISIT: CPT | Mod: ,,, | Performed by: STUDENT IN AN ORGANIZED HEALTH CARE EDUCATION/TRAINING PROGRAM

## 2023-07-21 NOTE — PROGRESS NOTES
Subjective:  HPI     Post-op Evaluation     Additional comments: 1 day post-op OS visit.  S/p Phaco w/IOL + goniotomy OS: 07/20/2023           Comments    Pt here for 1 day post-op OS visit. Pt states current 3.5/10 eye pain OS   with light sensitivity.    DLS: 06/23/2023    Gtts:  1. Alphagan TID OU   2. Cosopt BID OU   3. Latanoprost QHS OU  4. Pred Forte - has not used yet  5. Ketorolac - has not used yet  6. Moxifloxacin - has not used yet    POHx:  1. 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 Cataracts, right eye      Patient is interested in cataract surgery; interfering with activities   of daily living. Visually significant cataract causing significant   decrease in quality of life.       - Lens choice: OD DIBOO 19.5, MA60AC 19.5/18.5; OS DIBOO 19.5, MA60AC   19.5/18.5             Last edited by Jeanie Beth on 7/21/2023  9:19 AM.      Exam:  Base Eye Exam       Visual Acuity (Snellen - Linear)         Right Left    Dist sc  20/125    Dist ph sc  NI              Tonometry (Applanation, 9:18 AM)         Right Left    Pressure  26   Pt moving at slit lamp.             Neuro/Psych       Oriented x3: Yes    Mood/Affect: Normal                  Slit Lamp and Fundus Exam       External Exam         Right Left    External Normal Normal              Slit Lamp Exam         Right Left    Lids/Lashes Normal Normal    Conjunctiva/Sclera White and quiet Injected    Cornea Arcus 2+ DMF/edema    Anterior Chamber Deep and quiet Deep, 3+ cell, 1+ flare    Iris Round and reactive, dilates 5.5mm Round and reactive    Lens 2+ Nuclear sclerosis, 1+ Cortical cataract, 1+ Posterior subcapsular cataract Posterior chamber intraocular lens    Anterior Vitreous Posterior vitreous detachment Vitreous syneresis                  Assessment:  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 Per referral note, recent IOP OS 37. PMH hx prostate cancer,  CML, HTN.  - Surg hx: none   - Laser hx: SLT x 2 OU  - Glaucoma FHx: none  -  / 577  - Gonio: open/ with inferior PAS OU (Coulon 5/2023)  - Tmax: unknown OD, 37 per outside records OS  - Target IOP: not yet determined  - Med adverse effects: n/a    Baseline HVF 5/2023 -- VFI 97/95  Baseline RNFL 5/2023 -- avg 79/85  Baseline photos pending    5/23/23 LCV  HVF: reliable, early SNS, VFI 97 OD  borderline reliable, nasal step, superior depressed points, VFI 95 OS -- baseline  OCT RNFL: blunted ST/IT peak, avg 79 OD  early IT thinning, avg 85 OS -- baseline    Pseudophakia, left eye  07/21/2023  POD#1 s/p phaco/IOL/goniotomy 4 clock hours OS 7/20/23  Doing well overall, [facility] limited by edema and circulating RBCs  IOP 26 OS off drops, possibly with some retained viscoelastic    Cataract, right eye  Patient is interested in cataract surgery; interfering with activities of daily living. Visually significant cataract causing significant decrease in quality of life.  PAP 20/40 OU  - Guttae, PXF, or phacodonesis: none  - H/o LASIK/PRK, RK, or retinal surgery: none  - Dilation: poor (5.5mm)  - Target: plano  - Astigmatism: 1.4D OD, 1.92D OS; Pentacam with no astigmatism OD/irregular OS, discussed, declines toric; understands need for glasses OU  - Special needs: Trypan, Malyugin; No blood thinners  - Lens choice: OD DIBOO 19.5, MA60AC 19.5/18.5; OS DIBOO 19.5, MA60AC 19.5/18.5    Plan:  OcHTN vs mild POAG OU. Thick pachymetry with IOP 24/26 on MMT. Proceed with phaco/IOL/MIGS.    Today: POD#1 s/p phaco/IOL/goniotomy 4 clock hours OS. Doing well overall.  - Restart Cosopt 2/2; continue Brim 3/0, Xal 1/0 only for now  - Start PF q2-3-2-1 OS qweekly then stop  - Start Acular 4-3-2-1 OS qweekly then stop  - Start Moxi 4x OS x 7 days then stop    Return POW1 -- [facility], IOP    Bibi Jara MD  [facility], Glaucoma

## 2023-07-21 NOTE — PATIENT INSTRUCTIONS
POST-OPERATIVE CATARACT DROP INSTRUCTIONS    In the LEFT eye:  Prednisolone Acetate (PINK or WHITE top) and   Place 1 drop every 2 hours while awake x 1 week THEN  Starting 7/28/2023, place 1 drop 3 times daily x 1 week THEN  Starting 8/4/2023, place 1 drop 2 times daily x 1 week THEN  Starting 8/11/2023, place 1 drop 1 time daily x 1 week THEN STOP (end date: 8/18/2023).    Ketorolac (GREY top)  Place 1 drop 4 times daily x 1 week THEN  Starting 7/28/2023, place 1 drop 3 times daily x 1 week THEN  Starting 8/4/2023, place 1 drop 2 times daily x 1 week THEN  Starting 8/11/2023, place 1 drop 1 time daily x 1 week THEN STOP (end date: 8/18/2023).    Moxifloxacin (TAN top)  Place 1 drop 4 times daily x 7 days then STOP (end date: 7/28/2023).    ---------------------------------------------------------------------------------------    EYE PRESSURE DROPS:    Drug Eye Top Color Breakfast Lunch Dinner Bedtime   Alphagan:  Brimonidine RIGHT Purple X  X    Cosopt:  Dorzolamide/  Timolol BOTH Blue X  X    Xalatan:  Latanoprost RIGHT Teal    X     Continue all drops in the other eye as before.    Wait 5 minutes between the drops. The order of the drops is not important.    For the prednisolone, please shake the bottle before applying the drop    No heavy lifting, bending or straining for 10 days  Do not rub your eyes for 1 month  Do not get water in your eyes for 1 week  No swimming for 1 month  Please sleep with the shield over your eye for the next week to protect your eye during sleep    If you experience any increasing redness, sensitivity to light, pain, or changes in vision, please call the office immediately.    Bibi Jara MD  Office number: [phone]

## 2023-07-28 ENCOUNTER — OFFICE VISIT (OUTPATIENT)
Dept: OPHTHALMOLOGY | Facility: CLINIC | Age: 78
End: 2023-07-28
Payer: OTHER GOVERNMENT

## 2023-07-28 DIAGNOSIS — Z98.42 STATUS POST CATARACT EXTRACTION AND INSERTION OF INTRAOCULAR LENS, LEFT: Primary | ICD-10-CM

## 2023-07-28 DIAGNOSIS — Z96.1 STATUS POST CATARACT EXTRACTION AND INSERTION OF INTRAOCULAR LENS, LEFT: Primary | ICD-10-CM

## 2023-07-28 PROCEDURE — 99214 OFFICE O/P EST MOD 30 MIN: CPT | Mod: PBBFAC | Performed by: STUDENT IN AN ORGANIZED HEALTH CARE EDUCATION/TRAINING PROGRAM

## 2023-07-28 PROCEDURE — 99024 PR POST-OP FOLLOW-UP VISIT: ICD-10-PCS | Mod: ,,, | Performed by: STUDENT IN AN ORGANIZED HEALTH CARE EDUCATION/TRAINING PROGRAM

## 2023-07-28 PROCEDURE — 99999 PR PBB SHADOW E&M-EST. PATIENT-LVL IV: CPT | Mod: PBBFAC,,, | Performed by: STUDENT IN AN ORGANIZED HEALTH CARE EDUCATION/TRAINING PROGRAM

## 2023-07-28 PROCEDURE — 99024 POSTOP FOLLOW-UP VISIT: CPT | Mod: ,,, | Performed by: STUDENT IN AN ORGANIZED HEALTH CARE EDUCATION/TRAINING PROGRAM

## 2023-07-28 PROCEDURE — 99999 PR PBB SHADOW E&M-EST. PATIENT-LVL IV: ICD-10-PCS | Mod: PBBFAC,,, | Performed by: STUDENT IN AN ORGANIZED HEALTH CARE EDUCATION/TRAINING PROGRAM

## 2023-07-28 NOTE — PATIENT INSTRUCTIONS
POST-OPERATIVE CATARACT DROP INSTRUCTIONS     In the LEFT eye:  Ketorolac (GREY top)  Starting 7/28/2023, place 1 drop 3 times daily x 1 week THEN  Starting 8/4/2023, place 1 drop 2 times daily x 1 week THEN  Starting 8/11/2023, place 1 drop 1 time daily x 1 week THEN STOP (end date: 8/18/2023).    ---------------------------------------------------------------------------------------     EYE PRESSURE DROPS:     Drug Eye Top Color Breakfast Lunch Dinner Bedtime   Alphagan:  Brimonidine BOTH Purple X   X     Cosopt:  Dorzolamide/  Timolol BOTH Blue X   X     Xalatan:  Latanoprost BOTH Teal       X      Continue all drops in the other eye as before.     Wait 5 minutes between the drops. The order of the drops is not important.     For the prednisolone, please shake the bottle before applying the drop     No heavy lifting, bending or straining for 10 days  Do not rub your eyes for 1 month  Do not get water in your eyes for 1 week  No swimming for 1 month  Please sleep with the shield over your eye for the next week to protect your eye during sleep     If you experience any increasing redness, sensitivity to light, pain, or changes in vision, please call the office immediately.     Bibi Jara MD  Office number: [phone]

## 2023-07-28 NOTE — PROGRESS NOTES
"Subjective:  HPI     Post-op Evaluation     Additional comments: 1 week post-op visit OS.  S/p Phaco w/IOL + goniotomy OS: 07/20/2023           Comments    Pt here for 1 week post-op visit OS. Pt states occasional 1/10 eye pain OS   that "doesn't last long." Pt states that [facility] and colors in OS is much   brighter. Pt states that he would like to know if second eye surgery can   be scheduled.    DLS: 07/21/2023    Gtts:  1. Cosopt BID OU  2. Brimonidine TID OD  3. Latanoprost qhs OD  4. Pred Forte q2hrs OS - pt states that he believes that he is to stop   using drop today. Pt only used drop once today.  5. Ketorolac QID OS  6. Moxifloxacin QID OS    POHx:  1. 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 Pseudophakia, left eye      - s/p phaco/IOL/goniotomy 4 clock hours OS 7/20/23  3. Cataract, right eye      Patient is interested in cataract surgery; interfering with activities   of daily living. Visually significant cataract causing significant   decrease in quality of life.      - Lens choice: OD DIBOO 19.5, MA60AC 19.5/18.5; OS DIBOO 19.5, MA60AC   19.5/18.5          Last edited by Jeanie Beth on 7/28/2023  2:08 PM.      Exam:  Base Eye Exam       Visual Acuity (Snellen - Linear)         Right Left    Dist sc  20/30 -2              Tonometry (Applanation, 2:05 PM)         Right Left    Pressure 19 33              Gonioscopy (Khushbu)         Right Left    Temporal  SS    Nasal  SS, small open trabecular cleft    Superior  SS    Inferior  SS                  Slit Lamp and Fundus Exam       External Exam         Right Left    External Normal Normal              Slit Lamp Exam         Right Left    Lids/Lashes  Normal    Conjunctiva/Sclera  Injection, Pigmentation    Cornea  MCE main, arcus    Anterior Chamber  Rare cell    Iris  Round and reactive    Lens  Posterior chamber intraocular lens                  Assessment:  Ocular hypertension vs mild " POAG, both eyes  - Synopsis: [facility] s/p SLT OU. Per referral note, recent IOP OS 37. PMH hx prostate cancer, CML, HTN.  - Surg hx: none   - Laser hx: SLT x 2 OU  - Glaucoma FHx: none  -  / 577  - Gonio: open/ with inferior PAS OU (Coulon 5/2023)  - Tmax: unknown OD, 37 per outside records OS  - Target IOP: not yet determined  - Med adverse effects: n/a    Baseline HVF 5/2023 -- VFI 97/95  Baseline RNFL 5/2023 -- avg 79/85  Baseline photos pending    5/23/23 LCV  HVF: reliable, early SNS, VFI 97 OD  borderline reliable, nasal step, superior depressed points, VFI 95 OS -- baseline  OCT RNFL: blunted ST/IT peak, avg 79 OD  early IT thinning, avg 85 OS -- baseline    Pseudophakia, left eye  07/28/2023  POD#1 s/p phaco/IOL/goniotomy 4 clock hours OS 7/20/23  Doing well overall, [facility] limited by edema and circulating RBCs  IOP 26 OS off drops, possibly with some retained viscoelastic    Cataract, right eye  Patient is interested in cataract surgery; interfering with activities of daily living. Visually significant cataract causing significant decrease in quality of life.  PAP 20/40 OU  - Guttae, PXF, or phacodonesis: none  - H/o LASIK/PRK, RK, or retinal surgery: none  - Dilation: poor (5.5mm)  - Target: plano  - Astigmatism: 1.4D OD, 1.92D OS; Pentacam with no astigmatism OD/irregular OS, discussed, declines toric; understands need for glasses OU  - Special needs: Trypan, Malyugin; No blood thinners  - Lens choice: OD DIBOO 19.5, MA60AC 19.5/18.5; OS DIBOO 19.5, MA60AC 19.5/18.5    Plan:  OcHTN vs mild POAG OU. Thick pachymetry with IOP 24/26 on MMT. Proceed with phaco/IOL/MIGS.    Today: POW#1 s/p phaco/IOL/goniotomy 4 clock hours OS. Doing well overall; IOP high; ?Possible steroid response.  - Restart Cosopt 2/2, Brim 2/2, Xal 1/1  - STOP Moxi and PF  - Taper Acular 3-2-1 OS qweekly then stop    Return POM1 -- [facility], IOP, AutoRx, Dilate OD    Bibi Jara MD  [facility],  Glaucoma

## 2023-08-01 ENCOUNTER — TELEPHONE (OUTPATIENT)
Dept: OPHTHALMOLOGY | Facility: CLINIC | Age: 78
End: 2023-08-01
Payer: OTHER GOVERNMENT

## 2023-08-01 NOTE — TELEPHONE ENCOUNTER
Medical transport reimbursement records faxed to [facility] Travel Department on behalf of patient on 07/28/2023. Forms filled out for reimbursement for surgery and post-op travel on 07/20/2023, 07/21/2023, and 07/28/2023.    [facility] Travel Department  Office: [phone] ext. [phone]  FAX: [phone]

## 2023-08-18 ENCOUNTER — TELEPHONE (OUTPATIENT)
Dept: OPHTHALMOLOGY | Facility: CLINIC | Age: 78
End: 2023-08-18

## 2023-08-18 ENCOUNTER — OFFICE VISIT (OUTPATIENT)
Dept: OPHTHALMOLOGY | Facility: CLINIC | Age: 78
End: 2023-08-18
Payer: OTHER GOVERNMENT

## 2023-08-18 DIAGNOSIS — H25.811 COMBINED FORMS OF AGE-RELATED CATARACT OF RIGHT EYE: ICD-10-CM

## 2023-08-18 DIAGNOSIS — Z96.1 STATUS POST CATARACT EXTRACTION AND INSERTION OF INTRAOCULAR LENS, LEFT: Primary | ICD-10-CM

## 2023-08-18 DIAGNOSIS — Z98.42 STATUS POST CATARACT EXTRACTION AND INSERTION OF INTRAOCULAR LENS, LEFT: Primary | ICD-10-CM

## 2023-08-18 PROCEDURE — 99024 PR POST-OP FOLLOW-UP VISIT: ICD-10-PCS | Mod: ,,, | Performed by: STUDENT IN AN ORGANIZED HEALTH CARE EDUCATION/TRAINING PROGRAM

## 2023-08-18 PROCEDURE — 99213 OFFICE O/P EST LOW 20 MIN: CPT | Mod: PBBFAC | Performed by: STUDENT IN AN ORGANIZED HEALTH CARE EDUCATION/TRAINING PROGRAM

## 2023-08-18 PROCEDURE — 99024 POSTOP FOLLOW-UP VISIT: CPT | Mod: ,,, | Performed by: STUDENT IN AN ORGANIZED HEALTH CARE EDUCATION/TRAINING PROGRAM

## 2023-08-18 PROCEDURE — 99999 PR PBB SHADOW E&M-EST. PATIENT-LVL III: CPT | Mod: PBBFAC,,, | Performed by: STUDENT IN AN ORGANIZED HEALTH CARE EDUCATION/TRAINING PROGRAM

## 2023-08-18 PROCEDURE — 99999 PR PBB SHADOW E&M-EST. PATIENT-LVL III: ICD-10-PCS | Mod: PBBFAC,,, | Performed by: STUDENT IN AN ORGANIZED HEALTH CARE EDUCATION/TRAINING PROGRAM

## 2023-08-18 RX ORDER — SODIUM CHLORIDE 0.9 % (FLUSH) 0.9 %
2 SYRINGE (ML) INJECTION
Status: CANCELLED | OUTPATIENT
Start: 2023-08-18

## 2023-08-18 RX ORDER — CYCLOP/TROP/PROPA/PHEN/KET/WAT 1-1-0.1%
1 DROPS (EA) OPHTHALMIC (EYE)
Status: CANCELLED | OUTPATIENT
Start: 2023-08-18

## 2023-08-18 RX ORDER — PREDNISOLONE ACETATE 10 MG/ML
1 SUSPENSION/ DROPS OPHTHALMIC
Status: CANCELLED | OUTPATIENT
Start: 2023-08-18

## 2023-08-18 RX ORDER — MOXIFLOXACIN 5 MG/ML
1 SOLUTION/ DROPS OPHTHALMIC
Status: CANCELLED | OUTPATIENT
Start: 2023-08-18

## 2023-08-18 NOTE — PROGRESS NOTES
Subjective:  HPI     Post-op Evaluation     Additional comments: 1 mon po chk/dfe/auto rx           Comments    Pt here for 1 mon po chk- Pt states he has been having a FB sensation and   flashes in OS for several weeks now. No pain. No discharge.    DLS: 07/28/2023    Gtts:  1. Cosopt BID OU  2. Brimonidine BID OD  3. Latanoprost qhs OU    POHx:  1. 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 Pseudophakia, left eye      - s/p phaco/IOL/goniotomy 4 clock hours OS 7/20/23  3. Cataract, right eye      Patient is interested in cataract surgery; interfering with activities   of daily living. Visually significant cataract causing significant   decrease in quality of life.      - Lens choice: OD DIBOO 19.5, MA60AC 19.5/18.5; OS DIBOO 19.5, MA60AC   19.5/18.5          Last edited by Mina Spicer on 8/18/2023  9:07 AM.      Exam:  Base Eye Exam       Visual Acuity (Snellen - Linear)         Right Left    Dist cc 20/40 -2 20/30 -2+1      Correction: Glasses              Tonometry (Applanation, 9:13 AM)         Right Left    Pressure 15 16              Neuro/Psych       Oriented x3: Yes    Mood/Affect: Normal              Dilation       Both eyes: 1% Mydriacyl, 2.5% Phenylephrine @ 9:14 AM                  Slit Lamp and Fundus Exam       External Exam         Right Left    External Normal Normal              Slit Lamp Exam         Right Left    Lids/Lashes Normal Normal    Conjunctiva/Sclera White and quiet Injection, Pigmentation    Cornea Arcus Clear    Anterior Chamber Deep and quiet Deep and quiet    Iris Round and reactive, dilates 5.5mm Round and reactive    Lens 2+ Nuclear sclerosis, 1+ Cortical cataract, 1+ Posterior subcapsular cataract Posterior chamber intraocular lens    Anterior Vitreous Posterior vitreous detachment Vitreous syneresis, Neri's negative              Fundus Exam         Right Left    Disc Normal Normal    C/D Ratio 0.6 0.6     Macula Normal Normal, inferior myelinated NFL    Vessels Normal Normal    Periphery Normal Normal                  Assessment:  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 Per referral note, recent IOP OS 37. PMH hx prostate cancer, CML, HTN.  - Surg hx: none   - Laser hx: SLT x 2 OU  - Glaucoma FHx: none  -  / 577  - Gonio: open/ with inferior PAS OU (Coulon 5/2023)  - Tmax: unknown OD, 37 per outside records OS  - Target IOP: not yet determined  - Med adverse effects: n/a    Baseline HVF 5/2023 -- VFI 97/95  Baseline RNFL 5/2023 -- avg 79/85  Baseline photos pending    5/23/23 LCV  HVF: reliable, early SNS, VFI 97 OD  borderline reliable, nasal step, superior depressed points, VFI 95 OS -- baseline  OCT RNFL: blunted ST/IT peak, avg 79 OD  early IT thinning, avg 85 OS -- baseline    Pseudophakia, left eye  08/18/2023  POM#1 s/p phaco/IOL/goniotomy 4 clock hours OS 7/20/23  Doing well    Cataract, right eye  Patient is interested in cataract surgery; interfering with activities of daily living. Visually significant cataract causing significant decrease in quality of life.  PAP 20/40 OU  - Guttae, PXF, or phacodonesis: none  - H/o LASIK/PRK, RK, or retinal surgery: none  - Dilation: poor (5.5mm)  - Target: plano  - Astigmatism: 1.4D OD, 1.92D OS; Pentacam with no astigmatism OD/irregular OS, discussed, declines toric; understands need for glasses OU  - Special needs: Trypan, Malyugin; No blood thinners  - Lens choice: OD DIBOO 19.5, MA60AC 19.5/18.5; OS DIBOO 19.5, MA60AC 19.5/18.5    Plan:  OcHTN vs mild POAG OU. Thick pachymetry with IOP 24/26 on MMT. Proceed with phaco/IOL/MIGS.    Today: POM1 s/p phaco/IOL/goniotomy 4 clock hours OS. Doing well. Ready to schedule OD.  - Continue Cosopt 2/2, Brim 2/2, Xal 1/1    Return OR TBD -- phaco/IOL/goniotomy with [place] Dual Blade OD  Lens: DIBOO 19.5, MA60AC 19.5/18.5  Special needs: Trypan, [place] dual blade  x2, possibly Malyugin    Bibi Jara MD  [facility], Glaucoma

## 2023-08-18 NOTE — TELEPHONE ENCOUNTER
Transportation reimbursement form for 08/18/2023 post-op visit with Dr. Jara faxed to [facility] Travel Department on behalf of patient.    [facility] Travel Office  Office: [phone] ext. [phone]  Fax: [phone]

## 2023-09-14 ENCOUNTER — TELEPHONE (OUTPATIENT)
Dept: OPHTHALMOLOGY | Facility: CLINIC | Age: 78
End: 2023-09-14
Payer: OTHER GOVERNMENT

## 2023-09-14 NOTE — TELEPHONE ENCOUNTER
----- Message from Jeanie Beth sent at 9/14/2023  3:14 PM CDT -----  Regarding: FW: Questions    ----- Message -----  From: Jazmin Dawkins  Sent: 9/14/2023   2:10 PM CDT  To: [facility] Staff  Subject: Questions                                            Name Of Caller:    Dk      Contact Preference:   [phone]      Nature of call:   Calling to speak with a tech. Pt states his left eye is in pain, it's about a 7 out of 10.

## 2023-09-14 NOTE — TELEPHONE ENCOUNTER
Clinic attempted to leave VM for pt regarding in-basket msg. Pt's VM mailbox not set up, unable to leave VM for pt.

## 2023-10-04 ENCOUNTER — TELEPHONE (OUTPATIENT)
Dept: OPHTHALMOLOGY | Facility: CLINIC | Age: 78
End: 2023-10-04
Payer: OTHER GOVERNMENT

## 2023-10-05 ENCOUNTER — ANESTHESIA EVENT (OUTPATIENT)
Dept: SURGERY | Facility: HOSPITAL | Age: 78
End: 2023-10-05
Payer: OTHER GOVERNMENT

## 2023-10-05 ENCOUNTER — ANESTHESIA (OUTPATIENT)
Dept: SURGERY | Facility: HOSPITAL | Age: 78
End: 2023-10-05
Payer: OTHER GOVERNMENT

## 2023-10-05 ENCOUNTER — HOSPITAL ENCOUNTER (OUTPATIENT)
Facility: HOSPITAL | Age: 78
Discharge: HOME | End: 2023-10-05
Attending: STUDENT IN AN ORGANIZED HEALTH CARE EDUCATION/TRAINING PROGRAM | Admitting: STUDENT IN AN ORGANIZED HEALTH CARE EDUCATION/TRAINING PROGRAM
Payer: OTHER GOVERNMENT

## 2023-10-05 VITALS
BODY MASS INDEX: 36.48 KG/M2 | OXYGEN SATURATION: 100 % | SYSTOLIC BLOOD PRESSURE: 133 MMHG | DIASTOLIC BLOOD PRESSURE: 70 MMHG | WEIGHT: 226 LBS | RESPIRATION RATE: 16 BRPM | TEMPERATURE: 98 F | HEART RATE: 72 BPM

## 2023-10-05 DIAGNOSIS — H40.1131 PRIMARY OPEN ANGLE GLAUCOMA (POAG) OF BOTH EYES, MILD STAGE: Primary | ICD-10-CM

## 2023-10-05 DIAGNOSIS — H25.811 COMBINED FORMS OF AGE-RELATED CATARACT OF RIGHT EYE: ICD-10-CM

## 2023-10-05 LAB
POCT GLUCOSE: 106 MG/DL (ref 70–110)
POCT GLUCOSE: 94 MG/DL (ref 70–110)

## 2023-10-05 PROCEDURE — 36000709 HC OR TIME LEV III EA ADD 15 MIN: Performed by: STUDENT IN AN ORGANIZED HEALTH CARE EDUCATION/TRAINING PROGRAM

## 2023-10-05 PROCEDURE — 82962 GLUCOSE BLOOD TEST: CPT | Performed by: STUDENT IN AN ORGANIZED HEALTH CARE EDUCATION/TRAINING PROGRAM

## 2023-10-05 PROCEDURE — 65820 GONIOTOMY: CPT | Mod: 79,RT,, | Performed by: STUDENT IN AN ORGANIZED HEALTH CARE EDUCATION/TRAINING PROGRAM

## 2023-10-05 PROCEDURE — 65820 PR RELIEVE INNER EYE PRESSURE: ICD-10-PCS | Mod: 79,RT,, | Performed by: STUDENT IN AN ORGANIZED HEALTH CARE EDUCATION/TRAINING PROGRAM

## 2023-10-05 PROCEDURE — 37000009 HC ANESTHESIA EA ADD 15 MINS: Performed by: STUDENT IN AN ORGANIZED HEALTH CARE EDUCATION/TRAINING PROGRAM

## 2023-10-05 PROCEDURE — V2632 POST CHMBR INTRAOCULAR LENS: HCPCS | Performed by: STUDENT IN AN ORGANIZED HEALTH CARE EDUCATION/TRAINING PROGRAM

## 2023-10-05 PROCEDURE — 63600175 PHARM REV CODE 636 W HCPCS: Performed by: STUDENT IN AN ORGANIZED HEALTH CARE EDUCATION/TRAINING PROGRAM

## 2023-10-05 PROCEDURE — 37000008 HC ANESTHESIA 1ST 15 MINUTES: Performed by: STUDENT IN AN ORGANIZED HEALTH CARE EDUCATION/TRAINING PROGRAM

## 2023-10-05 PROCEDURE — 36000708 HC OR TIME LEV III 1ST 15 MIN: Performed by: STUDENT IN AN ORGANIZED HEALTH CARE EDUCATION/TRAINING PROGRAM

## 2023-10-05 PROCEDURE — 66982 XCAPSL CTRC RMVL CPLX WO ECP: CPT | Mod: 79,51,RT, | Performed by: STUDENT IN AN ORGANIZED HEALTH CARE EDUCATION/TRAINING PROGRAM

## 2023-10-05 PROCEDURE — 25000003 PHARM REV CODE 250: Performed by: STUDENT IN AN ORGANIZED HEALTH CARE EDUCATION/TRAINING PROGRAM

## 2023-10-05 PROCEDURE — 71000044 HC DOSC ROUTINE RECOVERY FIRST HOUR: Performed by: STUDENT IN AN ORGANIZED HEALTH CARE EDUCATION/TRAINING PROGRAM

## 2023-10-05 PROCEDURE — 71000015 HC POSTOP RECOV 1ST HR: Performed by: STUDENT IN AN ORGANIZED HEALTH CARE EDUCATION/TRAINING PROGRAM

## 2023-10-05 PROCEDURE — D9220A PRA ANESTHESIA: ICD-10-PCS | Mod: CRNA,,, | Performed by: STUDENT IN AN ORGANIZED HEALTH CARE EDUCATION/TRAINING PROGRAM

## 2023-10-05 PROCEDURE — D9220A PRA ANESTHESIA: Mod: CRNA,,, | Performed by: STUDENT IN AN ORGANIZED HEALTH CARE EDUCATION/TRAINING PROGRAM

## 2023-10-05 PROCEDURE — D9220A PRA ANESTHESIA: ICD-10-PCS | Mod: ANES,,, | Performed by: ANESTHESIOLOGY

## 2023-10-05 PROCEDURE — 66982 PR REMOVAL, CATARACT, W/INSRT INTRAOC LENS, W/O ENDO CYCLO, CMPLX: ICD-10-PCS | Mod: 79,51,RT, | Performed by: STUDENT IN AN ORGANIZED HEALTH CARE EDUCATION/TRAINING PROGRAM

## 2023-10-05 PROCEDURE — 27201423 OPTIME MED/SURG SUP & DEVICES STERILE SUPPLY: Performed by: STUDENT IN AN ORGANIZED HEALTH CARE EDUCATION/TRAINING PROGRAM

## 2023-10-05 PROCEDURE — D9220A PRA ANESTHESIA: Mod: ANES,,, | Performed by: ANESTHESIOLOGY

## 2023-10-05 DEVICE — LENS EYHANCE +19.5D: Type: IMPLANTABLE DEVICE | Site: EYE | Status: FUNCTIONAL

## 2023-10-05 RX ORDER — FENTANYL CITRATE 50 UG/ML
INJECTION, SOLUTION INTRAMUSCULAR; INTRAVENOUS
Status: DISCONTINUED | OUTPATIENT
Start: 2023-10-05 | End: 2023-10-05

## 2023-10-05 RX ORDER — ONDANSETRON 2 MG/ML
4 INJECTION INTRAMUSCULAR; INTRAVENOUS ONCE AS NEEDED
Status: DISCONTINUED | OUTPATIENT
Start: 2023-10-05 | End: 2023-10-05 | Stop reason: HOSPADM

## 2023-10-05 RX ORDER — LIDOCAINE HYDROCHLORIDE 40 MG/ML
INJECTION, SOLUTION RETROBULBAR
Status: DISCONTINUED | OUTPATIENT
Start: 2023-10-05 | End: 2023-10-05 | Stop reason: HOSPADM

## 2023-10-05 RX ORDER — CYCLOP/TROP/PROPA/PHEN/KET/WAT 1-1-0.1%
1 DROPS (EA) OPHTHALMIC (EYE)
Status: DISCONTINUED | OUTPATIENT
Start: 2023-10-05 | End: 2023-10-05 | Stop reason: HOSPADM

## 2023-10-05 RX ORDER — PREDNISOLONE ACETATE 10 MG/ML
1 SUSPENSION/ DROPS OPHTHALMIC
Status: DISCONTINUED | OUTPATIENT
Start: 2023-10-05 | End: 2023-10-05 | Stop reason: HOSPADM

## 2023-10-05 RX ORDER — LIDOCAINE HYDROCHLORIDE 40 MG/ML
INJECTION, SOLUTION RETROBULBAR
Status: DISCONTINUED
Start: 2023-10-05 | End: 2023-10-05 | Stop reason: HOSPADM

## 2023-10-05 RX ORDER — LIDOCAINE HYDROCHLORIDE 10 MG/ML
INJECTION, SOLUTION EPIDURAL; INFILTRATION; INTRACAUDAL; PERINEURAL
Status: DISCONTINUED | OUTPATIENT
Start: 2023-10-05 | End: 2023-10-05 | Stop reason: HOSPADM

## 2023-10-05 RX ORDER — MIDAZOLAM HYDROCHLORIDE 1 MG/ML
INJECTION INTRAMUSCULAR; INTRAVENOUS
Status: DISCONTINUED | OUTPATIENT
Start: 2023-10-05 | End: 2023-10-05

## 2023-10-05 RX ORDER — LIDOCAINE HYDROCHLORIDE 10 MG/ML
INJECTION, SOLUTION EPIDURAL; INFILTRATION; INTRACAUDAL; PERINEURAL
Status: DISCONTINUED
Start: 2023-10-05 | End: 2023-10-05 | Stop reason: HOSPADM

## 2023-10-05 RX ORDER — SODIUM CHLORIDE 0.9 % (FLUSH) 0.9 %
2 SYRINGE (ML) INJECTION
Status: DISCONTINUED | OUTPATIENT
Start: 2023-10-05 | End: 2023-10-05 | Stop reason: HOSPADM

## 2023-10-05 RX ORDER — MOXIFLOXACIN 5 MG/ML
1 SOLUTION/ DROPS OPHTHALMIC
Status: DISCONTINUED | OUTPATIENT
Start: 2023-10-05 | End: 2023-10-05 | Stop reason: HOSPADM

## 2023-10-05 RX ADMIN — Medication 1 DROP: at 10:10

## 2023-10-05 RX ADMIN — PREDNISOLONE ACETATE 1 DROP: 10 SUSPENSION/ DROPS OPHTHALMIC at 10:10

## 2023-10-05 RX ADMIN — MOXIFLOXACIN OPHTHALMIC 1 DROP: 5 SOLUTION/ DROPS OPHTHALMIC at 10:10

## 2023-10-05 RX ADMIN — FENTANYL CITRATE 25 MCG: 50 INJECTION, SOLUTION INTRAMUSCULAR; INTRAVENOUS at 11:10

## 2023-10-05 RX ADMIN — MIDAZOLAM HYDROCHLORIDE 2 MG: 1 INJECTION INTRAMUSCULAR; INTRAVENOUS at 10:10

## 2023-10-05 RX ADMIN — SODIUM CHLORIDE: 0.9 INJECTION, SOLUTION INTRAVENOUS at 10:10

## 2023-10-05 NOTE — ANESTHESIA POSTPROCEDURE EVALUATION
Anesthesia Post Evaluation    Patient: Dk Mcdaniels    Procedure(s) Performed: Procedure(s) (LRB):  PHACOEMULSIFICATION, CATARACT (Right)  INSERTION, IOL PROSTHESIS (Right)  GONIOTOMY (Right)    Final Anesthesia Type: MAC      Patient location during evaluation: [facility]  Patient participation: Yes- Able to Participate  Level of consciousness: awake and alert  Post-procedure vital signs: reviewed and stable  Pain management: adequate  Airway patency: patent    PONV status at discharge: No PONV  Anesthetic complications: no      Cardiovascular status: blood pressure returned to baseline  Respiratory status: unassisted  Hydration status: euvolemic  Follow-up not needed.          Vitals Value Taken Time   /70 10/05/23 1215   Temp 36.4 °C (97.5 °F) 10/05/23 1125   Pulse 72 10/05/23 1215   Resp 16 10/05/23 1215   SpO2 100 % 10/05/23 1215         No case tracking events are documented in the log.      Pain/Cory Score: Cory Score: 10 (10/5/2023 11:45 AM)

## 2023-10-05 NOTE — INTERVAL H&P NOTE
BRIEF HISTORY, PERTINENT EXAM:  H&P reviewed. No changes.    ASSESSMENT/PLAN:  Proceed with surgery as planned -- phaco/IOL/kahook goniotomy right eye    Bibi Jara MD

## 2023-10-05 NOTE — DISCHARGE SUMMARY
[facility] - [facility] (1st Fl)  Discharge Note  Short Stay    Procedure(s) (LRB):  PHACOEMULSIFICATION, CATARACT (Right)  INSERTION, IOL PROSTHESIS (Right)  GONIOTOMY (Right)    OUTCOME: Patient tolerated treatment/procedure well without complication and is now ready for discharge.    DISPOSITION: Home or Self Care    FINAL DIAGNOSIS:  <principal problem not specified>    FOLLOWUP: In clinic    DISCHARGE INSTRUCTIONS:  No discharge procedures on file.     TIME SPENT ON DISCHARGE: 5 minutes

## 2023-10-05 NOTE — ANESTHESIA PREPROCEDURE EVALUATION
"[facility]  Anesthesia Pre-Operative Evaluation       Patient Name: Dk Mcdaniels  YOB: 1945  MRN: [id number]  [facility]: [id number]      Code Status: No Order   Date of Procedure: 10/5/2023  Anesthesia: Local MAC Procedure: Procedure(s) (LRB):  PHACOEMULSIFICATION, CATARACT (Right)  INSERTION, IOL PROSTHESIS (Right)  GONIOTOMY (Right)  Pre-Operative Diagnosis: Combined forms of age-related cataract of right eye [H25.811]  Proceduralist: Surgeon(s) and Role:     * Bibi Jara MD - Primary        SUBJECTIVE:   Dk Mcdaniels is a 78 y.o. male who  has a past medical history of Diabetes mellitus, Hypertension, and Prostate cancer. No notes on file    No current facility-administered medications for this encounter.     he has a current medication list which includes the following long-term medication(s): amlodipine, aspirin, brimonidine 0.2%, carvedilol, chlorthalidone, dorzolamide, gabapentin, hydralazine, loratadine, losartan, and omeprazole.   ALLERGIES:     Review of patient's allergies indicates:   Allergen Reactions    Metoprolol Swelling and Other (See Comments)     Chest pains and breast swelling    Lisinopril Other (See Comments)     Coughing a lot after taking Lisinopril Med    Pcn [penicillins] Itching     " i break out from head to toe "     LDA:          Lines/Drains/Airways     None               MEDICATIONS:     Antibiotics (From admission, onward)    None        VTE Risk Mitigation (From admission, onward)    None        No current facility-administered medications for this encounter.          History:   There are no hospital problems to display for this patient.    Surgical History:    has a past surgical history that includes Prostatectomy; Penile prosthesis implant; Knee arthroscopy w/ meniscal repair (Right); Hernia repair; Phacoemulsification of cataract (Left, 7/20/2023); Intraocular prosthesis insertion (Left, 7/20/2023); and Goniotomy (Left, 7/20/2023).   Social " "History:    reports being sexually active and has had partner(s) who are female.  reports that he has never smoked. He does not have any smokeless tobacco history on file. He reports that he does not drink alcohol and does not use drugs.     OBJECTIVE:     Vital Signs (Most Recent):    Vital Signs Range (Last 24H):          There is no height or weight on file to calculate BMI.   Wt Readings from Last 4 Encounters:   07/20/23 103.9 kg (229 lb)   02/26/15 105.2 kg (232 lb)     Significant Labs:  Lab Results   Component Value Date    WBC 10.11 02/26/2015    HGB 13.7 (L) 02/26/2015    HCT 40.8 02/26/2015     02/26/2015     02/26/2015    K 3.9 02/26/2015     02/26/2015    CREATININE 1.1 02/26/2015    BUN 11 02/26/2015    CO2 22 (L) 02/26/2015     02/26/2015    CALCIUM 9.2 02/26/2015    ALKPHOS 46 (L) 02/26/2015    ALT 29 02/26/2015    AST 28 02/26/2015    ALBUMIN 3.9 02/26/2015     No LMP for male patient.  No results found for this or any previous visit (from the past 72 hour(s)).    EKG:   No results found for this or any previous visit.    TTE:  No results found for this or any previous visit.  No results found for: "EF"   No results found for this or any previous visit.  DARRIAN:  No results found for this or any previous visit.  Stress Test:  No results found for this or any previous visit.     LHC:  No results found for this or any previous visit.     PFT:  No results found for: "FEV1", "FVC", "KDR7MBT", "TLC", "DLCO"   ASSESSMENT/PLAN:         Pre-op Assessment    I have reviewed the Patient Summary Reports.     I have reviewed the Nursing Notes.    I have reviewed the Medications.     Review of Systems  Anesthesia Hx:  No problems with previous Anesthesia    Hematology/Oncology:  Hematology Normal   Oncology Normal     EENT/Dental:EENT/Dental Normal   Cardiovascular:   Exercise tolerance: good Hypertension    Pulmonary:  Pulmonary Normal    Renal/:  Renal/ Normal   "   Hepatic/GI:  Hepatic/GI Normal    Musculoskeletal:  Musculoskeletal Normal    Neurological:  Neurology Normal    Endocrine:   Diabetes    Dermatological:  Skin Normal    Psych:  Psychiatric Normal           Physical Exam  General: Well nourished    Airway:  Mallampati: III / II  Mouth Opening: Normal  TM Distance: Normal  Tongue: Normal  Neck ROM: Normal ROM    Dental:  Intact        Anesthesia Plan  Type of Anesthesia, risks & benefits discussed:    Anesthesia Type: Gen ETT, Gen Natural Airway, MAC  Intra-op Monitoring Plan: Standard ASA Monitors  Post Op Pain Control Plan: multimodal analgesia and IV/PO Opioids PRN  Induction:  IV  Airway Plan: Direct and Video, Post-Induction  Informed Consent: Informed consent signed with the Patient and all parties understand the risks and agree with anesthesia plan.  All questions answered.   ASA Score: 2  Day of Surgery Review of History & Physical: H&P completed by Anesthesiologist.  Anesthesia Plan Notes: Chart reviewed, patient interviewed and examined.  The anesthetic plan was explained.  Risks, benefits, and alternatives were discussed. Questions were answered and the consent was signed.        JAMES Guerra M.D.         Ready For Surgery From Anesthesia Perspective.     .

## 2023-10-05 NOTE — OP NOTE
DATE OF SURGERY: 10/5/2023    PREOPERATIVE DIAGNOSES:  1. Visually significant combined form cataract, right eye.  2. Primary open angle glaucoma, mild stage, right eye.    POSTOPERATIVE DIAGNOSES:  Same    PROCEDURES PERFORMED:  Cataract extraction with intraocular lens implant, right eye  Goniotomy, 4 clock hours, with kahook dual blade, right eye    ATTENDING SURGEON:  Bibi Jara M.D.    ANESTHESIA:  MAC    COMPLICATIONS:  None.    IMPLANTS:   Implant Name Type Inv. Item Serial No.  Lot No. LRB No. Used Action   LENS EYHANCE +19.5D - [id number]  LENS EYHANCE +19.5D [id number] [employer]  Right 1 Implanted         JUSTIFICATION OF SURGERY:     Dk Mcdaniels is a 78 y.o. male with a history and physical exam consistent with a visually significant cataract. We discussed his medical conditions and treatment options, including the risks, benefits, and alternatives of surgery. he expressed his understanding of the risks, benefits, and alternatives to the procedure including, but not limited to infection, bleeding, loss of vision, loss of eye, corneal edema, need for glasses, and need for further surgical intervention. After answering all his questions, there was an understanding of the issues involved with surgery and the consent was signed.    PROCEDURE:  Local anesthesia  Betadine paint and drop in holding room   Prep and drape in usual manner in OR  Time out according to protocol  Temporal lid speculum placed  Paracentesis made with sideport blade  Lidocaine and Omidria injected. Trypan blue injected and rinsed.  Viscoelastic injected  Clear corneal incision made with 2.5 mm keratome blade  Continuous curvilinear capsulorrhexis created using a prebent cystotome and Utrata forceps  Gentle hydrodissection until nucleus was mobile  Phacoemulsification tip used to disassemble the lens and remove it  Removal of remaining cortical material and epinuclear shell with the irrigation and  aspiration handpiece  Capsular bag inflated with Provisc  Intraocular lens injected into the capsular bag and centered  Healon was injected into the anterior chamber. The patient's head was rotated and the microscope was rotated. Gonioprism was used for visualization of the iridocorneal angle. The Kahook dual blade was inserted through the main corneal wound and and a goniotomy was performed approximately from 1:00 to 5:00. Heme was noted to reflux into the anterior chamber from unroofed Schlemm's canal.  Viscoelastic removed with the irrigation and aspiration handpiece  Both wounds hydrated, wounds checked and found to be watertight.  Subconjunctival injection of antibiotics and steroids  Speculum removed from the eye.  Anti-inflammatory and antibiotic drops instilled into the eye  Patch and plastic shield placed on the eye    The patient tolerated the procedure well. There were no complications and the patient left the operating room in good condition. Arrangements were made for the patient to be seen in the outpatient clinic on the first postoperative day.

## 2023-10-05 NOTE — DISCHARGE INSTRUCTIONS
POST-OPERATIVE CATARACT DROP INSTRUCTIONS    Do not start any post operative drops in the operative eye today. Please keep the shield on at all times until you see Dr. Jara.  Please bring all of your drops with you to your post-op appointment.    In the RIGHT eye:  Prednisolone Acetate (PINK or WHITE top)  Moxifloxacin (TAN top)    ---------------------------------------------------------------------------------------    Continue all drops in the other eye as before.  Drug Eye Top Color Breakfast Lunch Dinner Bedtime   Alphagan:  Brimonidine LEFT Purple X  X    Cosopt:  Dorzolamide/  Timolol LEFT Blue X  X    Xalatan:  Latanoprost LEFT Teal    X     Wait 5 minutes between the drops. The order of the drops is not important.    For the prednisolone, please shake the bottle before applying the drop    Sleep with the head of your bed elevated with 2-3 pillows  Tylenol only for pain, no blood thinners  No heavy lifting, bending or straining for 10 days  Do not rub your eyes for 1 month  Do not get water in your eyes for 1 week  No swimming for 1 month  Please sleep with the shield over your eye for the next week to protect your eye during sleep    If you experience any increasing redness, sensitivity to light, pain, or changes in vision, please call the office immediately.    Bibi Jraa MD  Office number: [phone]

## 2023-10-05 NOTE — TRANSFER OF CARE
Anesthesia Transfer of Care Note    Patient: Dk Mcdaniels    Procedure(s) Performed: Procedure(s) (LRB):  PHACOEMULSIFICATION, CATARACT (Right)  INSERTION, IOL PROSTHESIS (Right)  GONIOTOMY (Right)    Patient location: [facility]    Anesthesia Type: MAC    Transport from OR: Transported from OR on room air with adequate spontaneous ventilation    Post pain: adequate analgesia    Post assessment: no apparent anesthetic complications and tolerated procedure well    Post vital signs: stable    Level of consciousness: awake    Nausea/Vomiting: no nausea/vomiting    Complications: none    Transfer of care protocol was followed      Last vitals:   Visit Vitals  /64 (BP Location: Left arm, Patient Position: Lying)   Pulse 78   Temp 36.7 °C (98.1 °F) (Temporal)   Resp 18   Wt 102.5 kg (226 lb)   SpO2 100%   BMI 36.48 kg/m²

## 2023-10-06 ENCOUNTER — OFFICE VISIT (OUTPATIENT)
Dept: OPHTHALMOLOGY | Facility: CLINIC | Age: 78
End: 2023-10-06
Payer: OTHER GOVERNMENT

## 2023-10-06 DIAGNOSIS — Z96.1 STATUS POST CATARACT EXTRACTION AND INSERTION OF INTRAOCULAR LENS, RIGHT: Primary | ICD-10-CM

## 2023-10-06 DIAGNOSIS — Z98.41 STATUS POST CATARACT EXTRACTION AND INSERTION OF INTRAOCULAR LENS, RIGHT: Primary | ICD-10-CM

## 2023-10-06 PROCEDURE — 99024 PR POST-OP FOLLOW-UP VISIT: ICD-10-PCS | Mod: ,,, | Performed by: STUDENT IN AN ORGANIZED HEALTH CARE EDUCATION/TRAINING PROGRAM

## 2023-10-06 PROCEDURE — 99999 PR PBB SHADOW E&M-EST. PATIENT-LVL III: ICD-10-PCS | Mod: PBBFAC,,, | Performed by: STUDENT IN AN ORGANIZED HEALTH CARE EDUCATION/TRAINING PROGRAM

## 2023-10-06 PROCEDURE — 99999 PR PBB SHADOW E&M-EST. PATIENT-LVL III: CPT | Mod: PBBFAC,,, | Performed by: STUDENT IN AN ORGANIZED HEALTH CARE EDUCATION/TRAINING PROGRAM

## 2023-10-06 PROCEDURE — 99213 OFFICE O/P EST LOW 20 MIN: CPT | Mod: PBBFAC | Performed by: STUDENT IN AN ORGANIZED HEALTH CARE EDUCATION/TRAINING PROGRAM

## 2023-10-06 PROCEDURE — 99024 POSTOP FOLLOW-UP VISIT: CPT | Mod: ,,, | Performed by: STUDENT IN AN ORGANIZED HEALTH CARE EDUCATION/TRAINING PROGRAM

## 2023-10-06 NOTE — PATIENT INSTRUCTIONS
POST-OPERATIVE CATARACT DROP INSTRUCTIONS    In the RIGHT eye:  Prednisolone Acetate (PINK or WHITE top)  Place 1 drop 4 times daily x 1 week THEN  Starting 10/13/2023, place 1 drop 3 times daily x 1 week THEN  Starting 10/20/2023, place 1 drop 2 times daily x 1 week THEN  Starting 10/27/2023, place 1 drop 1 time daily x 1 week THEN STOP (end date: 11/3/2023).    Moxifloxacin (TAN top)  Place 1 drop 4 times daily x 10 days then STOP (end date: 10/16/2023).    GLAUCOMA DROPS  Drug Eye Top Color Breakfast Lunch Dinner Bedtime   Alphagan:  Brimonidine LEFT Purple X  X    Cosopt:  Dorzolamide/  Timolol LEFT Blue X  X    Xalatan:  Latanoprost BOTH Teal    X     ---------------------------------------------------------------------------------------    Wait 5 minutes between the drops. The order of the drops is not important.    For the prednisolone, please shake the bottle before applying the drop    No heavy lifting, bending or straining for 10 days  Do not rub your eyes for 1 month  Do not get water in your eyes for 1 week  No swimming for 1 month  Please sleep with the shield over your eye for the next week to protect your eye during sleep    If you experience any increasing redness, sensitivity to light, pain, or changes in vision, please call the office immediately.    Bibi Jara MD  Office number: [phone]

## 2023-10-06 NOTE — PROGRESS NOTES
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 Per referral note, recent IOP OS 37. PMH hx prostate cancer, CML, HTN.  - Surg hx: none   - Laser hx: SLT x 2 OU  - Glaucoma FHx: none  -  / 577  - Gonio: open/ with inferior PAS OU (Coulon 5/2023)  - Tmax: unknown OD, 37 per outside records OS  - Target IOP: not yet determined  - Med adverse effects: n/a    Baseline HVF 5/2023 -- VFI 97/95  Baseline RNFL 5/2023 -- avg 79/85  Baseline photos pending    5/23/23 LCV  HVF: reliable, early SNS, VFI 97 OD  borderline reliable, nasal step, superior depressed points, VFI 95 OS -- baseline  OCT RNFL: blunted ST/IT peak, avg 79 OD  early IT thinning, avg 85 OS -- baseline    Pseudophakia, both eyes  10/06/2023  POD#1 s/p phaco/IOL/goniotomy 4 clock hours OD 10/5/23  Doing well  S/p phaco/IOL/goniotomy 4 clock hours OS 7/20/23  Doing well    Plan:  OcHTN vs mild POAG OU. Thick pachymetry with IOP 24/26 on MMT. Proceed with phaco/IOL/MIGS.    Today: POD1 s/p phaco/IOL/goniotomy 4 clock hours OD 10/5/23. Also s/p phaco/IOL/goniotomy 4 clock hours OS 7/20/23.   - Continue PF 4-3-2-1 OD qweekly then stop  - Continue Moxi 4x OD x 10 days then stop  - Continue Cosopt 0/2, Brim 0/2; hold OD  - Restart Xal 1/1    Return POW#1 -- [facility], IOP    Bibi Jara MD  [facility], Glaucoma

## 2023-10-09 ENCOUNTER — TELEPHONE (OUTPATIENT)
Dept: OPHTHALMOLOGY | Facility: CLINIC | Age: 78
End: 2023-10-09
Payer: OTHER GOVERNMENT

## 2023-10-09 NOTE — TELEPHONE ENCOUNTER
Transportation reimbursement form faxed per Dr. Jara on behalf of pt to [facility] Travel Department for 10/06/2023 post-operative visit.    [facility] Travel Department  Office: [phone] ext. [phone]  FAX: [phone]

## 2023-10-17 ENCOUNTER — TELEPHONE (OUTPATIENT)
Dept: OPHTHALMOLOGY | Facility: CLINIC | Age: 78
End: 2023-10-17

## 2023-10-17 ENCOUNTER — OFFICE VISIT (OUTPATIENT)
Dept: OPHTHALMOLOGY | Facility: CLINIC | Age: 78
End: 2023-10-17
Payer: OTHER GOVERNMENT

## 2023-10-17 DIAGNOSIS — Z96.1 STATUS POST CATARACT EXTRACTION AND INSERTION OF INTRAOCULAR LENS, LEFT: ICD-10-CM

## 2023-10-17 DIAGNOSIS — Z96.1 STATUS POST CATARACT EXTRACTION AND INSERTION OF INTRAOCULAR LENS, RIGHT: Primary | ICD-10-CM

## 2023-10-17 DIAGNOSIS — Z98.41 STATUS POST CATARACT EXTRACTION AND INSERTION OF INTRAOCULAR LENS, RIGHT: Primary | ICD-10-CM

## 2023-10-17 DIAGNOSIS — Z98.42 STATUS POST CATARACT EXTRACTION AND INSERTION OF INTRAOCULAR LENS, LEFT: ICD-10-CM

## 2023-10-17 PROCEDURE — 99999 PR PBB SHADOW E&M-EST. PATIENT-LVL III: ICD-10-PCS | Mod: PBBFAC,,, | Performed by: STUDENT IN AN ORGANIZED HEALTH CARE EDUCATION/TRAINING PROGRAM

## 2023-10-17 PROCEDURE — 99024 PR POST-OP FOLLOW-UP VISIT: ICD-10-PCS | Mod: ,,, | Performed by: STUDENT IN AN ORGANIZED HEALTH CARE EDUCATION/TRAINING PROGRAM

## 2023-10-17 PROCEDURE — 99024 POSTOP FOLLOW-UP VISIT: CPT | Mod: ,,, | Performed by: STUDENT IN AN ORGANIZED HEALTH CARE EDUCATION/TRAINING PROGRAM

## 2023-10-17 PROCEDURE — 99999 PR PBB SHADOW E&M-EST. PATIENT-LVL III: CPT | Mod: PBBFAC,,, | Performed by: STUDENT IN AN ORGANIZED HEALTH CARE EDUCATION/TRAINING PROGRAM

## 2023-10-17 PROCEDURE — 99213 OFFICE O/P EST LOW 20 MIN: CPT | Mod: PBBFAC | Performed by: STUDENT IN AN ORGANIZED HEALTH CARE EDUCATION/TRAINING PROGRAM

## 2023-10-17 NOTE — PROGRESS NOTES
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 Per referral note, recent IOP OS 37. PMH hx prostate cancer, CML, HTN.  - Surg hx: none   - Laser hx: SLT x 2 OU  - Glaucoma FHx: none  -  / 577  - Gonio: open/ with inferior PAS OU (Coulon 5/2023)  - Tmax: unknown OD, 37 per outside records OS  - Target IOP: not yet determined  - Med adverse effects: n/a    Baseline HVF 5/2023 -- VFI 97/95  Baseline RNFL 5/2023 -- avg 79/85  Baseline photos pending    5/23/23 LCV  HVF: reliable, early SNS, VFI 97 OD  borderline reliable, nasal step, superior depressed points, VFI 95 OS -- baseline  OCT RNFL: blunted ST/IT peak, avg 79 OD  early IT thinning, avg 85 OS -- baseline    Pseudophakia, both eyes  10/17/2023  POW#1 s/p phaco/IOL/goniotomy 4 clock hours OD 10/5/23  Doing well  S/p phaco/IOL/goniotomy 4 clock hours OS 7/20/23  Doing well    Plan:  OcHTN vs mild POAG OU. Thick pachymetry with IOP 24/26 on MMT. Proceed with phaco/IOL/MIGS.    Today: POW1 s/p phaco/IOL/goniotomy 4 clock hours OD 10/5/23. Also s/p phaco/IOL/goniotomy 4 clock hours OS 7/20/23.   - Continue PF 3-2-1 OD qweekly then stop  - Continue Moxi 4x OD x 10 days then stop  - Continue Cosopt 0/2, Brim 0/2, Xal 1/1    Return POM#1 -- [facility], IOP, Manifest Rx, Dilate    Bibi Jara MD  [facility], Glaucoma

## 2023-11-21 ENCOUNTER — OFFICE VISIT (OUTPATIENT)
Dept: OPHTHALMOLOGY | Facility: CLINIC | Age: 78
End: 2023-11-21
Payer: OTHER GOVERNMENT

## 2023-11-21 ENCOUNTER — TELEPHONE (OUTPATIENT)
Dept: OPHTHALMOLOGY | Facility: CLINIC | Age: 78
End: 2023-11-21
Payer: OTHER GOVERNMENT

## 2023-11-21 DIAGNOSIS — Z98.42 STATUS POST CATARACT EXTRACTION AND INSERTION OF INTRAOCULAR LENS, LEFT: ICD-10-CM

## 2023-11-21 DIAGNOSIS — Z96.1 STATUS POST CATARACT EXTRACTION AND INSERTION OF INTRAOCULAR LENS, RIGHT: Primary | ICD-10-CM

## 2023-11-21 DIAGNOSIS — Z98.41 STATUS POST CATARACT EXTRACTION AND INSERTION OF INTRAOCULAR LENS, RIGHT: Primary | ICD-10-CM

## 2023-11-21 DIAGNOSIS — Z96.1 STATUS POST CATARACT EXTRACTION AND INSERTION OF INTRAOCULAR LENS, LEFT: ICD-10-CM

## 2023-11-21 PROCEDURE — 99212 OFFICE O/P EST SF 10 MIN: CPT | Mod: PBBFAC | Performed by: STUDENT IN AN ORGANIZED HEALTH CARE EDUCATION/TRAINING PROGRAM

## 2023-11-21 PROCEDURE — 99024 POSTOP FOLLOW-UP VISIT: CPT | Mod: ,,, | Performed by: STUDENT IN AN ORGANIZED HEALTH CARE EDUCATION/TRAINING PROGRAM

## 2023-11-21 PROCEDURE — 99999 PR PBB SHADOW E&M-EST. PATIENT-LVL II: CPT | Mod: PBBFAC,,, | Performed by: STUDENT IN AN ORGANIZED HEALTH CARE EDUCATION/TRAINING PROGRAM

## 2023-11-21 PROCEDURE — 99024 PR POST-OP FOLLOW-UP VISIT: ICD-10-PCS | Mod: ,,, | Performed by: STUDENT IN AN ORGANIZED HEALTH CARE EDUCATION/TRAINING PROGRAM

## 2023-11-21 PROCEDURE — 99999 PR PBB SHADOW E&M-EST. PATIENT-LVL II: ICD-10-PCS | Mod: PBBFAC,,, | Performed by: STUDENT IN AN ORGANIZED HEALTH CARE EDUCATION/TRAINING PROGRAM

## 2023-11-21 RX ORDER — PYRIDOXINE HCL (VITAMIN B6) 100 MG
100 TABLET ORAL
COMMUNITY
Start: 2023-08-01

## 2023-11-21 RX ORDER — PREDNISOLONE ACETATE 10 MG/ML
1 SUSPENSION/ DROPS OPHTHALMIC 4 TIMES DAILY
Qty: 5 ML | Refills: 1 | Status: SHIPPED | OUTPATIENT
Start: 2023-11-21 | End: 2024-11-20

## 2023-11-21 RX ORDER — FLUTICASONE PROPIONATE 50 MCG
SPRAY, SUSPENSION (ML) NASAL
COMMUNITY
Start: 2023-08-02

## 2023-11-21 RX ORDER — DORZOLAMIDE HYDROCHLORIDE AND TIMOLOL MALEATE 20; 5 MG/ML; MG/ML
SOLUTION/ DROPS OPHTHALMIC
COMMUNITY
Start: 2023-06-22

## 2023-11-21 RX ORDER — GUAIFENESIN 600 MG/1
1200 TABLET, EXTENDED RELEASE ORAL
COMMUNITY
Start: 2023-10-30

## 2023-11-21 RX ORDER — SIMETHICONE 80 MG
80 TABLET,CHEWABLE ORAL
COMMUNITY
Start: 2022-12-22

## 2023-11-21 RX ORDER — FERROUS SULFATE 325(65) MG
650 TABLET ORAL
COMMUNITY
Start: 2023-07-07

## 2023-11-21 RX ORDER — CETIRIZINE HYDROCHLORIDE 10 MG/1
10 TABLET ORAL
COMMUNITY
Start: 2023-10-31

## 2023-11-21 NOTE — TELEPHONE ENCOUNTER
Transportation travel reimbursement form faxed per Dr. Jara on behalf of patient to [facility].    [facility] Travel Office  Office: [phone] ext. [phone]  FAX: [phone]

## 2023-11-21 NOTE — PROGRESS NOTES
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 Per referral note, recent IOP OS 37. PMH hx prostate cancer, CML, HTN.  - Surg hx: none   - Laser hx: SLT x 2 OU  - Glaucoma FHx: none  -  / 577  - Gonio: open/ with inferior PAS OU (Coulon 5/2023)  - Tmax: unknown OD, 37 per outside records OS  - Target IOP: not yet determined  - Med adverse effects: n/a    Baseline HVF 5/2023 -- VFI 97/95  Baseline RNFL 5/2023 -- avg 79/85  Baseline photos pending    5/23/23 LCV  HVF: reliable, early SNS, VFI 97 OD  borderline reliable, nasal step, superior depressed points, VFI 95 OS -- baseline  OCT RNFL: blunted ST/IT peak, avg 79 OD  early IT thinning, avg 85 OS -- baseline    Pseudophakia, both eyes  11/21/2023  S/p phaco/IOL/goniotomy 4 clock hours OD 10/5/23  S/p phaco/IOL/goniotomy 4 clock hours OS 7/20/23  Doing well  Rebound iritis OU    Plan:  OcHTN vs mild POAG OU. Thick pachymetry with IOP 24/26 on MMT. Proceed with phaco/IOL/MIGS.    Today: Rebound iritis.  - Continue PF 4-3-2-1 OU qweekly then stop  - Change to Cosopt 2/2, Xal 1/1    Return 1 month -- [facility], IOP, Manifest Rx, Dilate    Bibi Jara MD  [facility], Glaucoma

## 2023-11-21 NOTE — PATIENT INSTRUCTIONS
POST OP DROPS:    In BOTH eyes:  Prednisolone Acetate (PINK or WHITE top)  Place 1 drop 4 times daily x 1 week THEN  Starting 11/28/2023, place 1 drop 3 times daily x 1 week THEN  Starting 12/5/2023, place 1 drop 2 times daily x 1 week THEN  Starting 12/12/2023, place 1 drop 1 time daily x 1 week THEN STOP (end date: 12/19/2023).    GLAUCOMA DROPS:  Drug Eye Top Color Breakfast Lunch Dinner Bedtime   Cosopt:  Dorzolamide/  Timolol BOTH Blue X  X    Xalatan:  Latanoprost BOTH Teal    X     If you experience any increasing redness, sensitivity to light, pain, or changes in vision, please call the office immediately.    Bibi Jara MD  Office number: [phone]

## 2023-12-27 ENCOUNTER — OFFICE VISIT (OUTPATIENT)
Dept: OPHTHALMOLOGY | Facility: CLINIC | Age: 78
End: 2023-12-27
Payer: OTHER GOVERNMENT

## 2023-12-27 DIAGNOSIS — Z98.42 STATUS POST CATARACT EXTRACTION AND INSERTION OF INTRAOCULAR LENS, LEFT: ICD-10-CM

## 2023-12-27 DIAGNOSIS — Z96.1 STATUS POST CATARACT EXTRACTION AND INSERTION OF INTRAOCULAR LENS, LEFT: ICD-10-CM

## 2023-12-27 DIAGNOSIS — Z96.1 STATUS POST CATARACT EXTRACTION AND INSERTION OF INTRAOCULAR LENS, RIGHT: Primary | ICD-10-CM

## 2023-12-27 DIAGNOSIS — Z98.41 STATUS POST CATARACT EXTRACTION AND INSERTION OF INTRAOCULAR LENS, RIGHT: Primary | ICD-10-CM

## 2023-12-27 PROCEDURE — 99999 PR PBB SHADOW E&M-EST. PATIENT-LVL IV: CPT | Mod: PBBFAC,,, | Performed by: STUDENT IN AN ORGANIZED HEALTH CARE EDUCATION/TRAINING PROGRAM

## 2023-12-27 PROCEDURE — 99999 PR PBB SHADOW E&M-EST. PATIENT-LVL IV: ICD-10-PCS | Mod: PBBFAC,,, | Performed by: STUDENT IN AN ORGANIZED HEALTH CARE EDUCATION/TRAINING PROGRAM

## 2023-12-27 PROCEDURE — 99024 PR POST-OP FOLLOW-UP VISIT: ICD-10-PCS | Mod: ,,, | Performed by: STUDENT IN AN ORGANIZED HEALTH CARE EDUCATION/TRAINING PROGRAM

## 2023-12-27 PROCEDURE — 99024 POSTOP FOLLOW-UP VISIT: CPT | Mod: ,,, | Performed by: STUDENT IN AN ORGANIZED HEALTH CARE EDUCATION/TRAINING PROGRAM

## 2023-12-27 PROCEDURE — 99214 OFFICE O/P EST MOD 30 MIN: CPT | Mod: PBBFAC | Performed by: STUDENT IN AN ORGANIZED HEALTH CARE EDUCATION/TRAINING PROGRAM

## 2023-12-27 NOTE — PROGRESS NOTES
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 Per referral note, recent IOP OS 37. PMH hx prostate cancer, CML, HTN.  - Surg hx: none   - Laser hx: SLT x 2 OU  - Glaucoma FHx: none  -  / 577  - Gonio: open/ with inferior PAS OU ([place] 5/2023)  - Tmax: unknown OD, 37 per outside records OS  - Target IOP: not yet determined  - Med adverse effects: n/a    Baseline HVF 5/2023 -- VFI 97/95  Baseline RNFL 5/2023 -- avg 79/85  Baseline photos pending    Last imaging:  HVF: reliable, early SNS, VFI 97 OD  borderline reliable, nasal step, superior depressed points, VFI 95 OS -- baseline  OCT RNFL: blunted ST/IT peak, avg 79 OD  early IT thinning, avg 85 OS -- baseline    12/27/2023  IOP adequate on Cosopt 2/2, Xalatan 1/1    Pseudophakia, both eyes  S/p phaco/IOL/goniotomy 4 clock hours OD 10/5/23  S/p phaco/IOL/goniotomy 4 clock hours OS 7/20/23  Uncorrected cyl, declined toric    Plan:  OcHTN vs mild POAG OU. Thick pachymetry with IOP 24/26 on MMT.     Today: Doing well. [facility] limited by uncorrected cyl/declined toric.   - Continue Cosopt 2/2, Xal 1/1  - MRx dispensed    Return 4 months -- HVF, OCT, [facility], IOP    Bibi Jara MD  [facility], Gl[facility]

## 2024-01-03 ENCOUNTER — TELEPHONE (OUTPATIENT)
Dept: OPHTHALMOLOGY | Facility: CLINIC | Age: 79
End: 2024-01-03
Payer: OTHER GOVERNMENT

## 2024-01-03 NOTE — TELEPHONE ENCOUNTER
Medical transportation reimbursement form faxed by Jessica Gómez (Ophthalmic Technician) on 12/27/2023 for visit with Dr. Jara.    [facility] Travel Affairs Office  Office: [phone] ext. [phone]  FAX: [phone]

## 2024-04-10 NOTE — TELEPHONE ENCOUNTER
Transportation reimbursement form faxed per Dr. Jara on behalf of pt to [facility] for 10/17/2023 1 week post-op visit (2nd eye).    [facility] Travel Office  Office: [phone], ext. [phone]  FAX: [phone]   oriented to person, place and time

## 2024-06-18 ENCOUNTER — TELEPHONE (OUTPATIENT)
Dept: OPHTHALMOLOGY | Facility: CLINIC | Age: 79
End: 2024-06-18
Payer: OTHER GOVERNMENT

## 2024-06-18 NOTE — TELEPHONE ENCOUNTER
----- Message from Jeanie Beth sent at 6/17/2024  3:22 PM CDT -----  Regarding: Dr. Jara follow up    ----- Message -----  From: Marta Guevara  Sent: 6/17/2024   2:53 PM CDT  To: [facility] Clinical Support Staff    Good Afternoon,     The [facility] would like to refer the following patient to the Ophthalmology  department. 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 I have scanned the patients referral and records into . Please schedule     Thank you,    Marta Beard

## 2024-06-18 NOTE — TELEPHONE ENCOUNTER
Appointment for follow up with Dr. Jara scheduled 07/26/2024 at 1:00 pm; patient last seen in clinic 12/27/2023.

## 2024-07-02 ENCOUNTER — TELEPHONE (OUTPATIENT)
Dept: OPHTHALMOLOGY | Facility: CLINIC | Age: 79
End: 2024-07-02
Payer: OTHER GOVERNMENT

## 2024-07-02 NOTE — TELEPHONE ENCOUNTER
Clinic left voicemail for patient to reschedule 07/26/2024 appointment with Dr. Jara; patient requested to reschedule appointment.

## 2024-07-02 NOTE — TELEPHONE ENCOUNTER
----- Message from Virginia Beckwith sent at 7/2/2024  4:42 PM CDT -----    ----- Message -----  From: Leslie Viramontes  Sent: 7/2/2024   4:33 PM CDT  To: Bibi Jara Staff    Pt returning call to clinic to reschedule francisca     Confirmed patient's contact info below:  Contact Name: Dk Mcdaniels  Phone Number: [phone]

## 2024-07-02 NOTE — TELEPHONE ENCOUNTER
----- Message from Jeanie Beth sent at 7/1/2024  4:21 PM CDT -----    ----- Message -----  From: Rabia Plata  Sent: 7/1/2024   1:07 PM CDT  To: Bibi Jara Staff    Puja,        I wanted to inform you that our patient, needs to reschedule their upcoming appointment.   They will be out of town on the originally scheduled appointment date.  Could you please assist in finding an alternative appointment slot. Kindly reach out to them to discuss available options and finalize a new appointment date.

## 2024-07-02 NOTE — TELEPHONE ENCOUNTER
Clinic left voicemail to reschedule 07/26/2024 appointment with testing with Dr. Jara per patient request.

## 2024-08-21 ENCOUNTER — CLINICAL SUPPORT (OUTPATIENT)
Dept: OPHTHALMOLOGY | Facility: CLINIC | Age: 79
End: 2024-08-21
Payer: OTHER GOVERNMENT

## 2024-08-21 ENCOUNTER — OFFICE VISIT (OUTPATIENT)
Dept: OPHTHALMOLOGY | Facility: CLINIC | Age: 79
End: 2024-08-21
Payer: OTHER GOVERNMENT

## 2024-08-21 DIAGNOSIS — Z96.1 PSEUDOPHAKIA OF BOTH EYES: ICD-10-CM

## 2024-08-21 DIAGNOSIS — H40.1131 PRIMARY OPEN ANGLE GLAUCOMA (POAG) OF BOTH EYES, MILD STAGE: Primary | ICD-10-CM

## 2024-08-21 PROCEDURE — 99999 PR PBB SHADOW E&M-EST. PATIENT-LVL IV: CPT | Mod: PBBFAC,,, | Performed by: STUDENT IN AN ORGANIZED HEALTH CARE EDUCATION/TRAINING PROGRAM

## 2024-08-21 PROCEDURE — 99214 OFFICE O/P EST MOD 30 MIN: CPT | Mod: PBBFAC | Performed by: STUDENT IN AN ORGANIZED HEALTH CARE EDUCATION/TRAINING PROGRAM

## 2024-08-21 PROCEDURE — G2211 COMPLEX E/M VISIT ADD ON: HCPCS | Mod: S$PBB,,, | Performed by: STUDENT IN AN ORGANIZED HEALTH CARE EDUCATION/TRAINING PROGRAM

## 2024-08-21 PROCEDURE — 92133 CPTRZD OPH DX IMG PST SGM ON: CPT | Mod: PBBFAC | Performed by: STUDENT IN AN ORGANIZED HEALTH CARE EDUCATION/TRAINING PROGRAM

## 2024-08-21 PROCEDURE — 99214 OFFICE O/P EST MOD 30 MIN: CPT | Mod: S$PBB,,, | Performed by: STUDENT IN AN ORGANIZED HEALTH CARE EDUCATION/TRAINING PROGRAM

## 2024-08-21 PROCEDURE — 92083 EXTENDED VISUAL FIELD XM: CPT | Mod: PBBFAC | Performed by: STUDENT IN AN ORGANIZED HEALTH CARE EDUCATION/TRAINING PROGRAM

## 2024-08-21 PROCEDURE — 92020 GONIOSCOPY: CPT | Mod: S$PBB,,, | Performed by: STUDENT IN AN ORGANIZED HEALTH CARE EDUCATION/TRAINING PROGRAM

## 2024-08-21 PROCEDURE — 92020 GONIOSCOPY: CPT | Mod: PBBFAC | Performed by: STUDENT IN AN ORGANIZED HEALTH CARE EDUCATION/TRAINING PROGRAM

## 2024-08-21 NOTE — PATIENT INSTRUCTIONS
Selective Laser Trabeculoplasty  Selective Laser Trabeculoplasty (SLT) is a laser treatment that is used to lower the pressure of the eye.  Laser energy stimulates the internal drainage tissue of the eye (called the trabecular meshwork) and lowering of the eye pressure. The full effects of the laser treatment may not be apparent until 1-2 months after the laser treatment.    Indications  Patients with open-angle glaucoma who are in need of eye pressure lowering.  This can be in addition to eye drops or sometimes in place of eye drops.    Benefits  When used as initial therapy, SLT can lower the eye pressure by about 30%.  The effect may be reduced in patients who are already on medical treatment with eye drops.  The laser is effective in lowering eye pressure in 75-80% of eyes and the effect of the laser can last up to 1-5 years. Some studies have shown effects lasting up to 3 years in 50% of patients. The procedure is non-invasive with minimal discomfort and is performed in an outpatient clinic.  Treatment of each eye is approximately 5-10 minutes and there are no restrictions of activities afterward.  The laser treatment can be repeated. Laser treatment does not affect the success rates of other medical or surgical treatments.    Risks  In about 10% of procedures, there is a temporary elevation of eye pressure immediately following the laser treatment that is managed with glaucoma medications and generally goes away within 24 hours.  Some patients also report a mild temporary eye discomfort from the lens used during the procedure that can be treated with over-the-counter artificial tears.    What to expect on the day of SLT  Check into clinic as you typically would for a routine clinic appointment.  A technician will then check your vision and your eye pressure and review any medications and eye drops you might be taking.  They will then review a consent form for the SLT procedure with you.      The doctor will  then review your exam findings and confirm that an SLT procedure is still the appropriate treatment.  The doctor will put a drop of anesthetic to the eye(s) that are to have the SLT.  The doctor will then position you in the laser machine and place a special contact lens on the surface of the eye.  The lens is coated with a special jelly to help focus the light properly into the eye.  You will then hear a series of clicks and may see flashes of red light as the laser treats the drainage tissue of the eye.  There is virtually no pain with the procedure.  You will feel the lens slowly rotating on the surface of the eye as the doctor treats various areas within the eye.  The procedure generally takes 5-10 minutes per eye.  At the conclusion of the laser treatment, the lens will be removed from the eye.  Because of the jelly used on the lens, it is normal for the vision to be temporarily blurred.      After the laser treatment, you will be asked to wait in the waiting area and your eye pressure will be checked after 20 minutes. If the pressure has not gone up, you will be released home after scheduling an appropriate follow up.       There are typically no restrictions in activities following laser treatment.  You may return to your normal daily routine.  While side effects of SLT are rare, should you experience any decrease in vision, worsening pain or worsening redness in the eye following your laser treatment, please contact your eye doctor for further instruction or come to the Emergency Room.     -----------    Glaucoma Tube Shunt Surgery    When is a tube shunt procedure indicated?  Glaucoma tube or shunt surgery may be needed in patients with glaucoma that is not controlled by medications and/or laser treatment. It may be needed either after failure of conventional surgery or in certain types of glaucoma where conventional surgery would almost certainly fail.   In conventional surgery, a tiny drainage hole is  made in the sclera (the white part of the eye). This procedure is known as trabeculectomy. This opening allows fluid to drain out of the eye under the delicate membrane covering the eyeball known as the conjunctiva. Locally applied medications or injections may be used to help keep the hole open.    With implant surgery, most of the device is positioned on the outside of the eye under the conjunctiva (eye tissue). A small tube is carefully inserted into the front chamber of the eye, just in front of the iris (colored part of the eye). The fluid drains through the tube, into the area around the back end of the implant. The fluid collects here and is reabsorbed. One cannot feel the implant and it is usually not visible unless one lifts the eyelid or opens the eye very widely.    What should I expect if I have a tube shunt procedure?  The procedure is done under local anesthesia in the operating room. After some eye drops are put in the eye and after the eye is numbed and cleaned, a sterile drape will be put over your face and body and will leave only your eye uncovered. Your eye will be held open by a small spring so you do not have to worry about blinking during the surgery. The anesthesia team will give you sedating medications through an IV (intravenous) line to keep you comfortable. After the surgery is over, a patch will be placed over your eye. This will be removed the day after surgery. You should expect to be seen frequently by your surgeon until the eye completely heals. For many people, this may take 6 to 8 weeks. During this time, you will be taking frequent and multiple eye drops.    Will my glaucoma be cured with the glaucoma tube device?  No. Any vision that is already lost prior to the surgery will not return. Tube shunt surgery only lowers your eye pressure. By lowering the eye pressure, the goal is to either stop or slow down your loss of vision. Most people will need some glaucoma eye drops to keep  their pressure under control. In some cases, people will be taking fewer eye drops than they were before the surgery. The need for eye drops after tube shunt surgery varies greatly and is determined by your type of glaucoma and the rate it is progressing.    What are some risks for tube shunt surgery?  All eye surgery has some risks. Any operation is not done unless the benefits outweigh the risks. Risks include, but are not limited to, bleeding, infection, hypotony (too low pressure), scarring, swelling, retinal detachment, droopy eyelid, double vision, loss of vision, or even loss of the eye. Sometimes the tube fails and needs to be replaced. In general, many of the risks are not common, however you may want to discuss the benefits and risks with your surgeon should you have any further questions. All surgery has the possible need for another operation.    Recommended websites  http://www.glaucoma.org/treatment/glaucoma-implants.php  Http://[email or web address].edu/site.[email or web address]

## 2024-08-21 NOTE — PROGRESS NOTES
Assessment:  1. Primary open angle glaucoma (POAG) of both eyes, mild stage  - Synopsis: [facility] SLT OU. Per referral note, recent IOP OS 37. PMH hx prostate cancer, CML, HTN.  - Surg hx: none   - Laser hx: SLT x 2 OU  - Glaucoma FHx: none  -  / 577  - Gonio: open/ with inferior PAS OU (Coulon 5/2023)  - Tmax: unknown OD, 37 per outside records OS  - Target IOP: not yet determined  - Med adverse effects: n/a    Baseline HVF 5/2023 -- VFI 97/95  Baseline RNFL 5/2023 -- avg 79/85  Baseline photos pending    Last imaging:  HVF: reliable, early SNS, VFI 97 OD  borderline reliable, nasal step, superior depressed points, VFI 95 OS -- baseline  OCT RNFL: blunted ST/IT peak, avg 79 OD  early IT thinning, avg 85 OS -- baseline    08/21/2024  IOP above goal OS on Brim 3/3, Cosopt 2/2, Xalatan 1/1  HVF: reliable, non-specific, VFI 99 OD  borderline reliable, early SNS, VFI 95 OS -- stable c/w 5/2023  OCT RNFL: blunted ST/IT peak, avg 83 OD  ST/IT thinning, avg 74 OS -- stable OD, worsened OS    2. Pseudophakia of both eyes  S/p phaco/IOL/goniotomy 4 clock hours OD 10/5/23  S/p phaco/IOL/goniotomy 4 clock hours OS 7/20/23  Uncorrected cyl, declined toric    Plan:  LTFU since 12/2023. [facility] IOP above goal OS. OCT worsened OS.  - Continue Cosopt 2/2, Xal 1/1, Brim 3/3  - Rec GDI vs 3rd attempt SLT - info given    Today's visit is associated with current and anticipated ongoing medical care related to this patient's single serious/complex condition (glaucoma). Follow up is to be continued indefinitely to monitor the condition.    [facility] week -- [facility], [facility]    Bibi Jara MD  [facility], Glaucoma

## 2024-08-21 NOTE — PROGRESS NOTES
VISUAL FIELD TEST 24-2 SS-OU-DONE/AB  OU-REL-FIX-COOP-GOOD/AB    PT HAS NO KNOWN ALLERGIES TO LATEX OR ADHESIVES./AB      MRX: OD -1.00 +0.25 X 114            OS -1.25 +2.00 X 168

## 2024-08-28 ENCOUNTER — OFFICE VISIT (OUTPATIENT)
Dept: OPHTHALMOLOGY | Facility: CLINIC | Age: 79
End: 2024-08-28
Payer: OTHER GOVERNMENT

## 2024-08-28 DIAGNOSIS — H40.1131 PRIMARY OPEN ANGLE GLAUCOMA (POAG) OF BOTH EYES, MILD STAGE: Primary | ICD-10-CM

## 2024-08-28 PROCEDURE — 99214 OFFICE O/P EST MOD 30 MIN: CPT | Mod: 25,S$PBB,, | Performed by: STUDENT IN AN ORGANIZED HEALTH CARE EDUCATION/TRAINING PROGRAM

## 2024-08-28 PROCEDURE — 65855 TRABECULOPLASTY LASER SURG: CPT | Mod: PBBFAC,LT | Performed by: STUDENT IN AN ORGANIZED HEALTH CARE EDUCATION/TRAINING PROGRAM

## 2024-08-28 PROCEDURE — 99999 PR PBB SHADOW E&M-EST. PATIENT-LVL IV: CPT | Mod: PBBFAC,,, | Performed by: STUDENT IN AN ORGANIZED HEALTH CARE EDUCATION/TRAINING PROGRAM

## 2024-08-28 PROCEDURE — 99214 OFFICE O/P EST MOD 30 MIN: CPT | Mod: PBBFAC | Performed by: STUDENT IN AN ORGANIZED HEALTH CARE EDUCATION/TRAINING PROGRAM

## 2024-08-28 RX ORDER — METHAZOLAMIDE 50 MG/1
50 TABLET ORAL 2 TIMES DAILY
Qty: 60 TABLET | Refills: 11 | Status: SHIPPED | OUTPATIENT
Start: 2024-08-28 | End: 2025-08-28

## 2024-08-28 NOTE — PROGRESS NOTES
Assessment:  1. Primary open angle glaucoma (POAG) of both eyes, mild stage  - Synopsis: [facility]T OU. Per referral note, recent IOP OS 37. PMH hx prostate cancer, CML, HTN.  - Surg hx: none   - Laser hx: SLT x 2 OU  - Glaucoma FHx: none  -  / 577  - Gonio: open/ with inferior PAS OU (Coulon 5/2023)  - Tmax: unknown OD, 37 per outside records OS  - Target IOP: not yet determined  - Med adverse effects: n/a    Baseline HVF 5/2023 -- VFI 97/95  Baseline RNFL 5/2023 -- avg 79/85  Baseline photos pending    Last imaging:  HVF: reliable, non-specific, VFI 99 OD  borderline reliable, early SNS, VFI 95 OS -- stable c/w 5/2023  OCT RNFL: blunted ST/IT peak, avg 83 OD  ST/IT thinning, avg 74 OS -- stable OD, worsened OS    08/28/2024  IOP above goal OS on Brim 3/3, Cosopt 2/2, Rocklatan 1/1  Patient read about GDD vs repeat SLT --> elected repeat SLT OS today, completed without complication    2. Pseudophakia of both eyes  S/p phaco/IOL/goniotomy 4 clock hours OD 10/5/23  S/p phaco/IOL/goniotomy 4 clock hours OS 7/20/23  Uncorrected cyl, declined toric    Plan:  LTFU since 12/2023. [facility]. IOP above goal OS. OCT worsened OS.  - Continue Cosopt 2/2, Xal 1/1, Brim 3/3  - SLT completed today without complication; Acular 0/4 x 1 week then stop  - Start MZM 50 mg PO BID (cannot do DMX due to kidney issues, self reported)    Today's visit is associated with current and anticipated ongoing medical care related to this patient's single serious/complex condition (glaucoma). Follow up is to be continued indefinitely to monitor the condition.    Return [facility] weeks -- [facility]    Bibi Jara MD  [facility], Glaucoma

## 2024-09-10 ENCOUNTER — TELEPHONE (OUTPATIENT)
Dept: OPHTHALMOLOGY | Facility: CLINIC | Age: 79
End: 2024-09-10
Payer: OTHER GOVERNMENT
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
## 2024-09-16 ENCOUNTER — TELEPHONE (OUTPATIENT)
Dept: OPHTHALMOLOGY | Facility: CLINIC | Age: 79
End: 2024-09-16
Payer: OTHER GOVERNMENT
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
## 2024-11-15 ENCOUNTER — OFFICE VISIT (OUTPATIENT)
Dept: OPHTHALMOLOGY | Facility: CLINIC | Age: 79
End: 2024-11-15
Payer: OTHER GOVERNMENT

## 2024-11-15 DIAGNOSIS — H40.1131 PRIMARY OPEN ANGLE GLAUCOMA (POAG) OF BOTH EYES, MILD STAGE: Primary | ICD-10-CM

## 2024-11-15 PROCEDURE — 99999 PR PBB SHADOW E&M-EST. PATIENT-LVL III: CPT | Mod: PBBFAC,,, | Performed by: STUDENT IN AN ORGANIZED HEALTH CARE EDUCATION/TRAINING PROGRAM

## 2024-11-15 PROCEDURE — 99213 OFFICE O/P EST LOW 20 MIN: CPT | Mod: PBBFAC | Performed by: STUDENT IN AN ORGANIZED HEALTH CARE EDUCATION/TRAINING PROGRAM

## 2024-11-18 ENCOUNTER — TELEPHONE (OUTPATIENT)
Dept: OPHTHALMOLOGY | Facility: CLINIC | Age: 79
End: 2024-11-18
Payer: OTHER GOVERNMENT

## 2024-11-18 DIAGNOSIS — H40.1131 PRIMARY OPEN ANGLE GLAUCOMA (POAG) OF BOTH EYES, MILD STAGE: Primary | ICD-10-CM

## 2024-11-18 NOTE — TELEPHONE ENCOUNTER
Patient states he needs to speak to daughter before scheduling surgery with  because she is his transportation. No surgery was schedule.

## 2024-12-17 NOTE — PRE-PROCEDURE INSTRUCTIONS
PreOp Instructions given:   - Verbal medication information (what to hold and what to take)   - NPO guidelines 2300  - Arrival place directions given; time to be given the day before procedure by the   Surgeon's Office [facility]  - Bathing with antibacterial soap   - Don't wear any jewelry or bring any valuables AM of surgery   - No makeup or moisturizer to face   - No perfume/cologne, powder, lotions or aftershave   Pt. verbalized understanding.   Pt denies any h/o Anesthesia/Sedation complications or side effects.  Patient does not know arrival time.  Explained that this information comes from the surgeon's office and if they haven't heard from them by 2 or 3 pm to call the office.  Patient stated an understanding.     Msg sent to Dr. Jara/Staff re: pt needing a later arrival time.

## 2024-12-18 ENCOUNTER — TELEPHONE (OUTPATIENT)
Dept: OPHTHALMOLOGY | Facility: CLINIC | Age: 79
End: 2024-12-18
Payer: OTHER GOVERNMENT

## 2024-12-19 ENCOUNTER — HOSPITAL ENCOUNTER (OUTPATIENT)
Facility: HOSPITAL | Age: 79
Discharge: HOME | End: 2024-12-19
Attending: STUDENT IN AN ORGANIZED HEALTH CARE EDUCATION/TRAINING PROGRAM | Admitting: STUDENT IN AN ORGANIZED HEALTH CARE EDUCATION/TRAINING PROGRAM
Payer: OTHER GOVERNMENT

## 2024-12-19 ENCOUNTER — ANESTHESIA EVENT (OUTPATIENT)
Dept: SURGERY | Facility: HOSPITAL | Age: 79
End: 2024-12-19
Payer: OTHER GOVERNMENT

## 2024-12-19 ENCOUNTER — ANESTHESIA (OUTPATIENT)
Dept: SURGERY | Facility: HOSPITAL | Age: 79
End: 2024-12-19
Payer: OTHER GOVERNMENT

## 2024-12-19 VITALS
DIASTOLIC BLOOD PRESSURE: 68 MMHG | WEIGHT: 230 LBS | TEMPERATURE: 98 F | HEART RATE: 76 BPM | SYSTOLIC BLOOD PRESSURE: 142 MMHG | BODY MASS INDEX: 36.1 KG/M2 | RESPIRATION RATE: 18 BRPM | OXYGEN SATURATION: 100 % | HEIGHT: 67 IN

## 2024-12-19 DIAGNOSIS — H40.1131 PRIMARY OPEN ANGLE GLAUCOMA (POAG) OF BOTH EYES, MILD STAGE: Primary | ICD-10-CM

## 2024-12-19 DIAGNOSIS — H40.9 GLAUCOMA, LEFT EYE: ICD-10-CM

## 2024-12-19 DIAGNOSIS — H40.1121 PRIMARY OPEN ANGLE GLAUCOMA (POAG) OF LEFT EYE, MILD STAGE: ICD-10-CM

## 2024-12-19 LAB — POCT GLUCOSE: 157 MG/DL (ref 70–110)

## 2024-12-19 PROCEDURE — 63600175 PHARM REV CODE 636 W HCPCS: Performed by: NURSE ANESTHETIST, CERTIFIED REGISTERED

## 2024-12-19 PROCEDURE — A4216 STERILE WATER/SALINE, 10 ML: HCPCS | Performed by: STUDENT IN AN ORGANIZED HEALTH CARE EDUCATION/TRAINING PROGRAM

## 2024-12-19 PROCEDURE — 36000706: Performed by: STUDENT IN AN ORGANIZED HEALTH CARE EDUCATION/TRAINING PROGRAM

## 2024-12-19 PROCEDURE — 63600175 PHARM REV CODE 636 W HCPCS: Performed by: STUDENT IN AN ORGANIZED HEALTH CARE EDUCATION/TRAINING PROGRAM

## 2024-12-19 PROCEDURE — C1783 OCULAR IMP, AQUEOUS DRAIN DE: HCPCS | Performed by: STUDENT IN AN ORGANIZED HEALTH CARE EDUCATION/TRAINING PROGRAM

## 2024-12-19 PROCEDURE — 37000009 HC ANESTHESIA EA ADD 15 MINS: Performed by: STUDENT IN AN ORGANIZED HEALTH CARE EDUCATION/TRAINING PROGRAM

## 2024-12-19 PROCEDURE — 25000003 PHARM REV CODE 250: Performed by: NURSE ANESTHETIST, CERTIFIED REGISTERED

## 2024-12-19 PROCEDURE — 66183 INSERT ANT DRAINAGE DEVICE: CPT | Mod: LT,,, | Performed by: STUDENT IN AN ORGANIZED HEALTH CARE EDUCATION/TRAINING PROGRAM

## 2024-12-19 PROCEDURE — 36000707: Performed by: STUDENT IN AN ORGANIZED HEALTH CARE EDUCATION/TRAINING PROGRAM

## 2024-12-19 PROCEDURE — 25000003 PHARM REV CODE 250: Performed by: STUDENT IN AN ORGANIZED HEALTH CARE EDUCATION/TRAINING PROGRAM

## 2024-12-19 PROCEDURE — 71000044 HC DOSC ROUTINE RECOVERY FIRST HOUR: Performed by: STUDENT IN AN ORGANIZED HEALTH CARE EDUCATION/TRAINING PROGRAM

## 2024-12-19 PROCEDURE — 82962 GLUCOSE BLOOD TEST: CPT | Performed by: STUDENT IN AN ORGANIZED HEALTH CARE EDUCATION/TRAINING PROGRAM

## 2024-12-19 PROCEDURE — 71000015 HC POSTOP RECOV 1ST HR: Performed by: STUDENT IN AN ORGANIZED HEALTH CARE EDUCATION/TRAINING PROGRAM

## 2024-12-19 PROCEDURE — 37000008 HC ANESTHESIA 1ST 15 MINUTES: Performed by: STUDENT IN AN ORGANIZED HEALTH CARE EDUCATION/TRAINING PROGRAM

## 2024-12-19 DEVICE — SYS XEN GLAUCOMA TREATMENT 6MM: Type: IMPLANTABLE DEVICE | Site: EYE | Status: FUNCTIONAL

## 2024-12-19 RX ORDER — SODIUM CHLORIDE 0.9 % (FLUSH) 0.9 %
10 SYRINGE (ML) INJECTION
Status: DISCONTINUED | OUTPATIENT
Start: 2024-12-19 | End: 2024-12-19 | Stop reason: HOSPADM

## 2024-12-19 RX ORDER — PREDNISOLONE ACETATE 10 MG/ML
SUSPENSION/ DROPS OPHTHALMIC
Status: DISCONTINUED | OUTPATIENT
Start: 2024-12-19 | End: 2024-12-19 | Stop reason: HOSPADM

## 2024-12-19 RX ORDER — FENTANYL CITRATE 50 UG/ML
INJECTION, SOLUTION INTRAMUSCULAR; INTRAVENOUS
Status: DISCONTINUED | OUTPATIENT
Start: 2024-12-19 | End: 2024-12-19

## 2024-12-19 RX ORDER — PROPARACAINE HYDROCHLORIDE 5 MG/ML
1 SOLUTION/ DROPS OPHTHALMIC
Status: DISCONTINUED | OUTPATIENT
Start: 2024-12-19 | End: 2024-12-19 | Stop reason: HOSPADM

## 2024-12-19 RX ORDER — GLUCAGON 1 MG
1 KIT INJECTION
Status: DISCONTINUED | OUTPATIENT
Start: 2024-12-19 | End: 2024-12-19 | Stop reason: HOSPADM

## 2024-12-19 RX ORDER — PHENYLEPHRINE HYDROCHLORIDE 10 MG/ML
INJECTION INTRAVENOUS
Status: DISCONTINUED | OUTPATIENT
Start: 2024-12-19 | End: 2024-12-19

## 2024-12-19 RX ORDER — PREDNISOLONE ACETATE 10 MG/ML
1 SUSPENSION/ DROPS OPHTHALMIC
Status: DISCONTINUED | OUTPATIENT
Start: 2024-12-19 | End: 2024-12-19 | Stop reason: HOSPADM

## 2024-12-19 RX ORDER — ONDANSETRON HYDROCHLORIDE 2 MG/ML
4 INJECTION, SOLUTION INTRAVENOUS DAILY PRN
Status: DISCONTINUED | OUTPATIENT
Start: 2024-12-19 | End: 2024-12-19 | Stop reason: HOSPADM

## 2024-12-19 RX ORDER — DEXAMETHASONE SODIUM PHOSPHATE 4 MG/ML
INJECTION, SOLUTION INTRA-ARTICULAR; INTRALESIONAL; INTRAMUSCULAR; INTRAVENOUS; SOFT TISSUE
Status: DISCONTINUED | OUTPATIENT
Start: 2024-12-19 | End: 2024-12-19 | Stop reason: HOSPADM

## 2024-12-19 RX ORDER — HYDROMORPHONE HYDROCHLORIDE 1 MG/ML
0.2 INJECTION, SOLUTION INTRAMUSCULAR; INTRAVENOUS; SUBCUTANEOUS EVERY 5 MIN PRN
Status: DISCONTINUED | OUTPATIENT
Start: 2024-12-19 | End: 2024-12-19 | Stop reason: HOSPADM

## 2024-12-19 RX ORDER — ONDANSETRON HYDROCHLORIDE 2 MG/ML
INJECTION, SOLUTION INTRAVENOUS
Status: DISCONTINUED | OUTPATIENT
Start: 2024-12-19 | End: 2024-12-19

## 2024-12-19 RX ORDER — SODIUM CHLORIDE 0.9 % (FLUSH) 0.9 %
3 SYRINGE (ML) INJECTION
Status: DISCONTINUED | OUTPATIENT
Start: 2024-12-19 | End: 2024-12-19 | Stop reason: HOSPADM

## 2024-12-19 RX ORDER — MOXIFLOXACIN 5 MG/ML
1 SOLUTION/ DROPS OPHTHALMIC
Status: DISCONTINUED | OUTPATIENT
Start: 2024-12-19 | End: 2024-12-19 | Stop reason: HOSPADM

## 2024-12-19 RX ORDER — ATROPINE SULFATE 10 MG/ML
SOLUTION/ DROPS OPHTHALMIC
Status: DISCONTINUED | OUTPATIENT
Start: 2024-12-19 | End: 2024-12-19 | Stop reason: HOSPADM

## 2024-12-19 RX ORDER — EPHEDRINE SULFATE 50 MG/ML
INJECTION, SOLUTION INTRAVENOUS
Status: DISCONTINUED | OUTPATIENT
Start: 2024-12-19 | End: 2024-12-19

## 2024-12-19 RX ORDER — PROPOFOL 10 MG/ML
VIAL (ML) INTRAVENOUS
Status: DISCONTINUED | OUTPATIENT
Start: 2024-12-19 | End: 2024-12-19

## 2024-12-19 RX ORDER — OXYCODONE AND ACETAMINOPHEN 5; 325 MG/1; MG/1
1 TABLET ORAL
Status: DISCONTINUED | OUTPATIENT
Start: 2024-12-19 | End: 2024-12-19 | Stop reason: HOSPADM

## 2024-12-19 RX ORDER — ROCURONIUM BROMIDE 10 MG/ML
INJECTION, SOLUTION INTRAVENOUS
Status: DISCONTINUED | OUTPATIENT
Start: 2024-12-19 | End: 2024-12-19

## 2024-12-19 RX ORDER — MOXIFLOXACIN 5 MG/ML
SOLUTION/ DROPS OPHTHALMIC
Status: DISCONTINUED | OUTPATIENT
Start: 2024-12-19 | End: 2024-12-19 | Stop reason: HOSPADM

## 2024-12-19 RX ORDER — LIDOCAINE HYDROCHLORIDE 20 MG/ML
INJECTION, SOLUTION EPIDURAL; INFILTRATION; INTRACAUDAL; PERINEURAL
Status: DISCONTINUED | OUTPATIENT
Start: 2024-12-19 | End: 2024-12-19

## 2024-12-19 RX ORDER — HALOPERIDOL 5 MG/ML
0.5 INJECTION INTRAMUSCULAR EVERY 10 MIN PRN
Status: DISCONTINUED | OUTPATIENT
Start: 2024-12-19 | End: 2024-12-19 | Stop reason: HOSPADM

## 2024-12-19 RX ADMIN — FENTANYL CITRATE 25 MCG: 50 INJECTION, SOLUTION INTRAMUSCULAR; INTRAVENOUS at 12:12

## 2024-12-19 RX ADMIN — FENTANYL CITRATE 75 MCG: 50 INJECTION, SOLUTION INTRAMUSCULAR; INTRAVENOUS at 12:12

## 2024-12-19 RX ADMIN — PROPARACAINE HYDROCHLORIDE 1 DROP: 5 SOLUTION/ DROPS OPHTHALMIC at 11:12

## 2024-12-19 RX ADMIN — ONDANSETRON 4 MG: 2 INJECTION INTRAMUSCULAR; INTRAVENOUS at 12:12

## 2024-12-19 RX ADMIN — MOXIFLOXACIN OPHTHALMIC 1 DROP: 5 SOLUTION/ DROPS OPHTHALMIC at 11:12

## 2024-12-19 RX ADMIN — SUGAMMADEX 200 MG: 100 INJECTION, SOLUTION INTRAVENOUS at 01:12

## 2024-12-19 RX ADMIN — ROCURONIUM BROMIDE 50 MG: 10 INJECTION, SOLUTION INTRAVENOUS at 12:12

## 2024-12-19 RX ADMIN — PHENYLEPHRINE HYDROCHLORIDE 100 MCG: 10 INJECTION INTRAVENOUS at 12:12

## 2024-12-19 RX ADMIN — WATER 0.06 MG: 1 INJECTION, SOLUTION INTRAMUSCULAR; INTRAVENOUS; SUBCUTANEOUS at 01:12

## 2024-12-19 RX ADMIN — LIDOCAINE HYDROCHLORIDE 100 MG: 20 INJECTION, SOLUTION EPIDURAL; INFILTRATION; INTRACAUDAL; PERINEURAL at 12:12

## 2024-12-19 RX ADMIN — SODIUM CHLORIDE: 0.9 INJECTION, SOLUTION INTRAVENOUS at 11:12

## 2024-12-19 RX ADMIN — VASOPRESSIN 1 UNITS: 20 INJECTION INTRAVENOUS at 12:12

## 2024-12-19 RX ADMIN — PROPOFOL 150 MG: 10 INJECTION, EMULSION INTRAVENOUS at 12:12

## 2024-12-19 RX ADMIN — PREDNISOLONE ACETATE 1 DROP: 10 SUSPENSION/ DROPS OPHTHALMIC at 11:12

## 2024-12-19 RX ADMIN — EPHEDRINE SULFATE 10 MG: 50 INJECTION INTRAVENOUS at 01:12

## 2024-12-19 NOTE — OP NOTE
Dk Mcdaniels  [id number]  12/19/2024    PREOPERATIVE DIAGNOSIS: Primary open angle glaucoma (POAG) of left eye, mild stage    POSTOPERATIVE DIAGNOSIS: Same as preoperative diagnosis    OPERATION PERFORMED: Xen gel stent insertion with MMC injection, left eye (insertion of aqueous device without external reservoir)    SURGEON: Primary: Bibi Jara MD    ASSISTANT SURGEON: Angel Woodruff MD    ANESTHESIA: General endotracheal    COMPLICATIONS:  None    FINDINGS:  Anatomy as expected    ESTIMATED BLOOD LOSS:  Minimal    SPECIMENS:  * No specimens in log *    IMPLANTS:    Implant Name Type Inv. Item Serial No.  Lot No. LRB No. Used Action   SYS XEN GLAUCOMA TREATMENT 6MM - [id number]  SYS XEN GLAUCOMA TREATMENT 6MM [id number] Blurb  Left 1 Implanted     JUSTIFICATION OF SURGERY:  Dk Mcdaniels is a 79 y.o. male with a history and physical exam consistent with uncontrolled intraocular pressure.  We extensively discussed his medical conditions and multiple treatment options, including the risks, benefits, and alternatives of surgery.  He understands the potential for bleeding, infection, vision loss, and other related complications and recovery issues.  After answering all the questions, there was an understanding of the issues involved with surgery and the consent was signed.    PROCEDURE:    Betadine paint and drop in holding room  Prep and drape in usual manner in OR  Time out according to protocol   A 6-0 vicryl traction suture was placed and eye was rotated downward  0.15 mL of mitomycin C (of 0.4mg/ml --> 60 micrograms total) was injected into the subconjunctival space 10 mm back from limbus  Copious BSS irrigation   Vannas and Westcotts were then used to make a peritomy in the superonasal quadrant   Next, wet-field cautery was used in the underlying scleral bed  Next, calipers were used to measure 2.5 mm posterior to the surgical limbus at the 1 o'clock position  27 g needle used to enter the anterior  chamber  The Xen gel stent was then carefully inserted into the anterior chamber  The Xen gel stent was left in position at 1 mm into the anterior chamber, 2 mm intrasclerally, and 3 mm externalized.   Tenons and conjunctiva were then brought forward completely covering the Xen gel stent and secured to the limbus with 8-0 Vicryl sutures  A posterior bleb was formed  Fluorescein strip confirmed Jhonathan negative  The eye was noted to have good physiological pressure with positive bleb formation and no apparent leaks  Ancef and Decadron injected superotemporal   At this time, the wire speculum was removed under the microscope.  Atropine drops were placed on the eye. Pred Forte and Vigamox were then instilled into the operative eye.   The traction suture was removed and the eye was covered with a patch and shield and turned over to Anesthesia in stable condition.     The patient tolerated the procedure well. There were no unexpected findings or complications. The patient will be seen in clinic tomorrow for a postoperative check up.

## 2024-12-19 NOTE — TRANSFER OF CARE
"Anesthesia Transfer of Care Note    Patient: Dk Mcdaniels    Procedure(s) Performed: Procedure(s) (LRB):  INSERTION, DEVICE, FOR GLAUCOMA (Left)    Patient location: OPS    Anesthesia Type: general    Transport from OR: Transported from OR on 6-10 L/min O2 by face mask with adequate spontaneous ventilation    Post pain: adequate analgesia    Post assessment: no apparent anesthetic complications    Post vital signs: stable    Level of consciousness: alert, oriented and awake    Complications: none    Transfer of care protocol was followed      Last vitals: Visit Vitals  BP (!) 142/68 (BP Location: Left arm, Patient Position: Sitting)   Pulse 76   Temp 36.4 °C (97.5 °F) (Temporal)   Resp 18   Ht 5' 7" (1.702 m)   Wt 104.3 kg (230 lb)   SpO2 100%   BMI 36.02 kg/m²     "

## 2024-12-19 NOTE — DISCHARGE SUMMARY
[facility] (1st Fl)  Discharge Note  Short Stay    Procedure(s) (LRB):  INSERTION, DEVICE, FOR GLAUCOMA (Left)    OUTCOME: Patient tolerated treatment/procedure well without complication and is now ready for discharge.    DISPOSITION: Home or Self Care    FINAL DIAGNOSIS:  Primary open angle glaucoma (POAG) of both eyes, mild stage    FOLLOWUP: In clinic    DISCHARGE INSTRUCTIONS:  No discharge procedures on file.     TIME SPENT ON DISCHARGE: 5 minutes

## 2024-12-19 NOTE — ANESTHESIA PROCEDURE NOTES
Intubation    Date/Time: 12/19/2024 12:21 PM    Performed by: Lombard, Jeffrey C., CRNA  Authorized by: Rajiv Moraes Jr., MD    Intubation:     Induction:  Inhalational - mask    Intubated:  Postinduction    Mask Ventilation:  Easy with oral airway    Attempts:  1    Attempted By:  OTILIA    Blade:  Castaneda 3    Laryngeal View Grade: Grade I - full view of cords      Difficult Airway Encountered?: No      Complications:  None    Airway Device:  Direct    Airway Device Size:  7.0    Style/Cuff Inflation:  Cuffed (inflated to minimal occlusive pressure)    Tube secured:  23    Secured at:  The lips    Placement Verified By:  Auscultation    Complicating Factors:  None    Findings Post-Intubation:  Bilateral breath sounds, positive ETCO2 and atraumatic / condition of teeth unchanged
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
## 2024-12-19 NOTE — INTERVAL H&P NOTE
BRIEF HISTORY, PERTINENT EXAM:  H&P reviewed. No changes.    ASSESSMENT/PLAN:  Proceed with surgery as planned -- Xen gel stent with Mitomycin C, left eye    Bibi Jara MD

## 2024-12-19 NOTE — ANESTHESIA PREPROCEDURE EVALUATION
"                                                                                                             12/19/2024  Dk Mcdaniels is a 79 y.o., male.    Procedure: INSERTION, DEVICE, FOR GLAUCOMA (Left: Eye) - Xen with 60 mcg MMC  Needs: Xen gel stent x2, MMC vials x2   Anesthesia type: Local MAC   Diagnosis: Primary open angle glaucoma (POAG) of both eyes, mild stage [H40.1131]       Pre-operative evaluation for Procedure(s) (LRB):  INSERTION, DEVICE, FOR GLAUCOMA (Left)      Encounter Diagnoses   Name Primary?    Primary open angle glaucoma (POAG) of both eyes, mild stage Yes    Primary open angle glaucoma (POAG) of left eye, mild stage     Glaucoma, left eye        Review of patient's allergies indicates:   Allergen Reactions    Metoprolol Swelling and Other (See Comments)     Chest pains and breast swelling    Lisinopril Other (See Comments)     Coughing a lot after taking Lisinopril Med    Pcn [penicillins] Itching     " i break out from head to toe "       Medications Prior to Admission   Medication Sig Dispense Refill Last Dose/Taking    allopurinoL (ZYLOPRIM) 100 MG tablet Take 50 mg by mouth every evening.       amlodipine (NORVASC) 10 MG tablet Take 10 mg by mouth once daily.       aspirin (ECOTRIN) 81 MG EC tablet Take 81 mg by mouth once daily.       brimonidine 0.2% (ALPHAGAN) 0.2 % Drop 1 drop every 8 (eight) hours.       carvediloL (COREG) 25 MG tablet Take 1 tablet by mouth 2 (two) times daily.       cetirizine (ZYRTEC) 10 MG tablet 10 mg.       chlorthalidone (HYGROTEN) 25 MG Tab Take 25 mg by mouth once daily.       dorzolamide (TRUSOPT) 2 % ophthalmic solution 1 drop 3 (three) times daily.       dorzolamide-timolol 2-0.5% (COSOPT) 22.3-6.8 mg/mL ophthalmic solution INSTILL 1 DROP IN EACH EYE TWICE A DAY FOR GLAUCOMA FOR GLAUCOMA       empagliflozin (JARDIANCE) 25 mg tablet Take 1 tablet by mouth every morning.       ferrous sulfate (FEOSOL) 325 mg (65 mg iron) Tab tablet 650 mg.       " fluticasone propionate (FLONASE) 50 mcg/actuation nasal spray INSTILL 2 SPRAYS IN EACH NOSTRIL EVERY DAY FOR ALLERGIES       FOLIC ACID/MULTIVITS-MIN/LUT (CENTRUM SILVER ORAL) Take by mouth.       gabapentin (NEURONTIN) 300 MG capsule Take 1 capsule by mouth 2 (two) times daily. Takes 300 mg every AM and 600 mg every PM       guaiFENesin (MUCINEX) 600 mg 12 hr tablet 1,200 mg.       hydrALAZINE (APRESOLINE) 50 MG tablet Take 50 mg by mouth every 12 (twelve) hours. (Patient not taking: Reported on 12/17/2024)   Not Taking    imatinib (GLEEVEC) 100 MG Tab Take by mouth once daily.       ketorolac 0.5% (ACULAR) 0.5 % Drop Place 1 drop into the left eye 4 (four) times daily. 5 mL 1     loratadine (CLARITIN) 10 mg tablet Take 10 mg by mouth once daily.       losartan (COZAAR) 100 MG tablet Take 1 tablet by mouth once daily.       magnesium oxide (MAG-OX) 400 mg (241.3 mg magnesium) tablet 400 mg.       methazoLAMIDE (NEPTAZANE) 50 MG Tab Take 1 tablet (50 mg total) by mouth 2 (two) times daily. 60 tablet 11     methocarbamol (ROBAXIN) 500 MG Tab Take 500 mg by mouth every evening.       moxifloxacin (VIGAMOX) 0.5 % ophthalmic solution 1 drop 4 times daily in the left eye for 10 total days after surgery. 3 mL 0     naproxen (NAPROSYN) 500 MG tablet Take by mouth 2 (two) times daily with meals.       netarsudiL-latanoprost 0.02-0.005 % Drop Place 1 drop into both eyes every evening. 2.5 mL 11     omeprazole (PRILOSEC) 20 MG capsule Take 20 mg by mouth once daily.       pyridoxine, vitamin B6, (B-6) 100 MG Tab 100 mg.       semaglutide (OZEMPIC) 1 mg/dose (2 mg/1.5 mL) PnIj Inject 1 mg into the skin every 7 days.   11/14/2024    senna (SENOKOT) 8.6 mg tablet 8.6 mg.       simethicone (MYLICON) 80 MG chewable tablet 80 mg.               No current facility-administered medications on file prior to encounter.     Current Outpatient Medications on File Prior to Encounter   Medication Sig Dispense Refill    allopurinoL  (ZYLOPRIM) 100 MG tablet Take 50 mg by mouth every evening.      amlodipine (NORVASC) 10 MG tablet Take 10 mg by mouth once daily.      aspirin (ECOTRIN) 81 MG EC tablet Take 81 mg by mouth once daily.      brimonidine 0.2% (ALPHAGAN) 0.2 % Drop 1 drop every 8 (eight) hours.      carvediloL (COREG) 25 MG tablet Take 1 tablet by mouth 2 (two) times daily.      cetirizine (ZYRTEC) 10 MG tablet 10 mg.      chlorthalidone (HYGROTEN) 25 MG Tab Take 25 mg by mouth once daily.      dorzolamide (TRUSOPT) 2 % ophthalmic solution 1 drop 3 (three) times daily.      dorzolamide-timolol 2-0.5% (COSOPT) 22.3-6.8 mg/mL ophthalmic solution INSTILL 1 DROP IN EACH EYE TWICE A DAY FOR GLAUCOMA FOR GLAUCOMA      empagliflozin (JARDIANCE) 25 mg tablet Take 1 tablet by mouth every morning.      ferrous sulfate (FEOSOL) 325 mg (65 mg iron) Tab tablet 650 mg.      fluticasone propionate (FLONASE) 50 mcg/actuation nasal spray INSTILL 2 SPRAYS IN EACH NOSTRIL EVERY DAY FOR ALLERGIES      FOLIC ACID/MULTIVITS-MIN/LUT (CENTRUM SILVER ORAL) Take by mouth.      gabapentin (NEURONTIN) 300 MG capsule Take 1 capsule by mouth 2 (two) times daily. Takes 300 mg every AM and 600 mg every PM      guaiFENesin (MUCINEX) 600 mg 12 hr tablet 1,200 mg.      hydrALAZINE (APRESOLINE) 50 MG tablet Take 50 mg by mouth every 12 (twelve) hours. (Patient not taking: Reported on 12/17/2024)      imatinib (GLEEVEC) 100 MG Tab Take by mouth once daily.      ketorolac 0.5% (ACULAR) 0.5 % Drop Place 1 drop into the left eye 4 (four) times daily. 5 mL 1    loratadine (CLARITIN) 10 mg tablet Take 10 mg by mouth once daily.      losartan (COZAAR) 100 MG tablet Take 1 tablet by mouth once daily.      magnesium oxide (MAG-OX) 400 mg (241.3 mg magnesium) tablet 400 mg.      methazoLAMIDE (NEPTAZANE) 50 MG Tab Take 1 tablet (50 mg total) by mouth 2 (two) times daily. 60 tablet 11    methocarbamol (ROBAXIN) 500 MG Tab Take 500 mg by mouth every evening.      moxifloxacin  (VIGAMOX) 0.5 % ophthalmic solution 1 drop 4 times daily in the left eye for 10 total days after surgery. 3 mL 0    naproxen (NAPROSYN) 500 MG tablet Take by mouth 2 (two) times daily with meals.      netarsudiL-latanoprost 0.02-0.005 % Drop Place 1 drop into both eyes every evening. 2.5 mL 11    omeprazole (PRILOSEC) 20 MG capsule Take 20 mg by mouth once daily.      pyridoxine, vitamin B6, (B-6) 100 MG Tab 100 mg.      semaglutide (OZEMPIC) 1 mg/dose (2 mg/1.5 mL) PnIj Inject 1 mg into the skin every 7 days.      senna (SENOKOT) 8.6 mg tablet 8.6 mg.      simethicone (MYLICON) 80 MG chewable tablet 80 mg.         Past Medical History:  No date: Diabetes mellitus  No date: Hypertension  No date: Prostate cancer    Past Surgical History:   Procedure Laterality Date    GONIOTOMY Left 7/20/2023    Procedure: GONIOTOMY;  Surgeon: Bibi Jara MD;  Location: [facility] OR [address] [place] [address];  Service: Ophthalmology;  Laterality: Left;    GONIOTOMY Right 10/5/2023    Procedure: GONIOTOMY;  Surgeon: Bibi Jara MD;  Location: [address];  Service: Ophthalmology;  Laterality: Right;    HERNIA REPAIR      INTRAOCULAR PROSTHESES INSERTION Left 7/20/2023    Procedure: INSERTION, IOL PROSTHESIS;  Surgeon: Bibi Jara MD;  Location: [address];  Service: Ophthalmology;  Laterality: Left;    INTRAOCULAR PROSTHESES INSERTION Right 10/5/2023    Procedure: INSERTION, IOL PROSTHESIS;  Surgeon: Bibi Jara MD;  Location: [address];  Service: Ophthalmology;  Laterality: Right;    KNEE ARTHROSCOPY W/ MENISCAL REPAIR Right     PENILE PROSTHESIS IMPLANT      PHACOEMULSIFICATION OF CATARACT Left 7/20/2023    Procedure: PHACOEMULSIFICATION, CATARACT;  Surgeon: Bibi Jara MD;  Location: [facility] OR [address] [place] [address];  Service: Ophthalmology;  Laterality: Left;    PHACOEMULSIFICATION OF CATARACT Right 10/5/2023    Procedure: PHACOEMULSIFICATION, CATARACT;  Surgeon: Bibi Jara MD;  Location: [facility] OR [address] [place] [address];   "Service: Ophthalmology;  Laterality: Right;    PROSTATECTOMY         Social History     Tobacco Use   Smoking Status Never   Smokeless Tobacco Not on file       Social History     Substance and Sexual Activity   Alcohol Use No       Physical Activity: Inactive (12/13/2024)    Exercise Vital Sign     Days of Exercise per Week: 0 days     Minutes of Exercise per Session: 0 min       No birth history on file.  No results for input(s): "HCT" in the last 72 hours.  No results for input(s): "PLT" in the last 72 hours.  No results for input(s): "K" in the last 72 hours.  No results for input(s): "CREATININE" in the last 72 hours.  No results for input(s): "GLU" in the last 72 hours.  No results for input(s): "PT" in the last 72 hours.  There were no vitals filed for this visit.                    Pre-op Assessment          Review of Systems  Anesthesia Hx:  No problems with previous Anesthesia                Hematology/Oncology:  Hematology Normal   Oncology Normal                Hematology Comments: Last baby asa Tuesday                Oncology Comments: Prostate, CML-no sequelae,      Cardiovascular:     Denies Pacemaker. Hypertension, well controlled   Denies MI.     Denies CABG/stent.    Denies Angina.                                    Pulmonary:    Denies COPD.  Denies Asthma.  Shortness of breath   1 block GARCIA and needs walker, cuts grass, cooks and cleans               Renal/:  Chronic Renal Disease, CKD                Hepatic/GI:      Denies Liver Disease.               Neurological:  Denies TIA.  Denies CVA.    Denies Seizures.                                Endocrine:  Diabetes, type 2   Last ozempic a month ago, no asssoc nausea or emesis            Physical Exam  General: Well nourished, Cooperative, Alert and Oriented    Airway:  Mallampati: II   Mouth Opening: Normal  TM Distance: Normal  Tongue: Normal  Neck ROM: Normal ROM        Anesthesia Plan  Type of Anesthesia, risks & benefits " discussed:    Anesthesia Type: Gen Natural Airway, Gen ETT  Intra-op Monitoring Plan: Standard ASA Monitors  Post Op Pain Control Plan: IV/PO Opioids PRN  Induction:  IV  Informed Consent: Informed consent signed with the Patient and all parties understand the risks and agree with anesthesia plan.  All questions answered.   ASA Score: 2  Day of Surgery Review of History & Physical: H&P Update referred to the surgeon/provider.    Ready For Surgery From Anesthesia Perspective.     .

## 2024-12-19 NOTE — DISCHARGE INSTRUCTIONS
POST OP DROPS:    Do not start any post operative drops in the operative eye today. Please keep the shield on at all times until you see Dr. Jara.  Please bring all of your drops with you to your post-op appointment.    In your gray bag- please bring tomorrow  Prednisolone Acetate (PINK or WHITE top)  Moxifloxacin (TAN top)  Atropine (RED top)    GLAUCOMA DROPS - continue in your non-operative RIGHT eye  Drug Eye Top Color Breakfast Lunch Dinner Bedtime   Alphagan:  Brimonidine RIGHT Purple X  X    Cosopt:  Dorzolamide/  Timolol RIGHT Blue X  X    Xalatan:  Latanoprost RIGHT Teal    X     WAIT 5 MINUTES BETWEEN DROPS. THE ORDER OF THE DROPS DOES NOT MATTER.    No heavy lifting, bending or straining for 10 days  Do not rub your eyes for 1 month  Do not get water in your eyes for 1 week  No swimming for 1 month  Please sleep with the shield over your eye for the next week to protect your eye during sleep    If you experience any increasing redness, sensitivity to light, pain, or changes in vision, please call the office immediately.    Bibi Jara MD  Office number: [phone]

## 2024-12-19 NOTE — ANESTHESIA POSTPROCEDURE EVALUATION
Anesthesia Post Evaluation    Patient: kD Mcdaniels    Procedure(s) Performed: Procedure(s) (LRB):  INSERTION, DEVICE, FOR GLAUCOMA (Left)    Final Anesthesia Type: general      Patient location during evaluation: PACU  Patient participation: Yes- Able to Participate  Level of consciousness: awake and alert and oriented  Post-procedure vital signs: reviewed and stable  Pain management: adequate  Airway patency: patent    PONV status at discharge: No PONV  Anesthetic complications: no      Cardiovascular status: stable  Respiratory status: unassisted, spontaneous ventilation and room air  Hydration status: euvolemic  Follow-up not needed.              Vitals Value Taken Time   /68 12/19/24 1401   Temp 36.4 °C (97.5 °F) 12/19/24 1330   Pulse 74 12/19/24 1407   Resp 15 12/19/24 1407   SpO2 100 % 12/19/24 1407   Vitals shown include unfiled device data.      No case tracking events are documented in the log.      Pain/Cory Score: Cory Score: 10 (12/19/2024  2:00 PM)

## 2024-12-20 ENCOUNTER — OFFICE VISIT (OUTPATIENT)
Dept: OPHTHALMOLOGY | Facility: CLINIC | Age: 79
End: 2024-12-20
Payer: OTHER GOVERNMENT

## 2024-12-20 DIAGNOSIS — H40.1131 PRIMARY OPEN ANGLE GLAUCOMA (POAG) OF BOTH EYES, MILD STAGE: Primary | ICD-10-CM

## 2024-12-20 PROCEDURE — 99214 OFFICE O/P EST MOD 30 MIN: CPT | Mod: PBBFAC | Performed by: STUDENT IN AN ORGANIZED HEALTH CARE EDUCATION/TRAINING PROGRAM

## 2024-12-20 PROCEDURE — 99999 PR PBB SHADOW E&M-EST. PATIENT-LVL IV: CPT | Mod: PBBFAC,,, | Performed by: STUDENT IN AN ORGANIZED HEALTH CARE EDUCATION/TRAINING PROGRAM

## 2024-12-20 NOTE — PROGRESS NOTES
Assessment:  1. Primary open angle glaucoma (POAG) of both eyes, mild stage  - Synopsis[facility] SLT OU. Per referral note, recent IOP OS 37. PMH hx prostate cancer, CML, HTN.  - Surg hx: none   - Laser hx: SLT x 2 OU  - Glaucoma FHx: none  -  / 577  - Gonio: open/ with inferior PAS OU (Coulon 5/2023)  - Tmax: unknown OD, 37 per outside records OS  - Target IOP: not yet determined  - Med adverse effects: n/a    Baseline HVF 5/2023 -- VFI 97/95  Baseline RNFL 5/2023 -- avg 79/85  Baseline photos pending    Last imaging:  HVF: reliable, non-specific, VFI 99 OD  borderline reliable, early SNS, VFI 95 OS -- stable c/w 5/2023  OCT RNFL: blunted ST/IT peak, avg 83 OD  ST/IT thinning, avg 74 OS -- stable OD, worsened OS    12/20/2024  POD#1 s/p Xen with MMC OS 12/19/24  At end of surgery, chamber formed with no hyphema  Today, hyphema with shallow AC -- declines straining? Possibly with extubation??  1 drop betadine and moxifloxacin given. Eyelid speculum placed. Regular healon injected through IT para to reform anterior chamber. Post IOP 30, 30 minutes after procedure.  B-scan without choroidals- ?aqueous misdirection? Vs straining or pressure on eye    2. Pseudophakia of both eyes  S/p phaco/IOL/goniotomy 4 clock hours OD 10/5/23  S/p phaco/IOL/goniotomy 4 clock hours OS 7/20/23  Uncorrected cyl, declined toric    Plan:  POD#1 s/p Xen with MMC OS 12/19/24  - RESTART Cosopt 2/2, Xal 1/1, Brim 3/3  - HOLD MZM 50 mg PO BID   - Start PF 0/q2h, Moxi 0/4, Atropine 0/2    Today's visit is associated with current and anticipated ongoing medical care related to this patient's single serious/complex condition (glaucoma). Follow up is to be continued indefinitely to monitor the condition.    Return POW#1 -- [facility], IOP, sooner PRN    Bibi Evgeny Coulon, MD  [facility], Glaucoma

## 2024-12-20 NOTE — PATIENT INSTRUCTIONS
POST OP DROPS:    In the LEFT eye:  Prednisolone Acetate (PINK or WHITE top)  SHAKE then place 1 drop every 2 hours while awake    Moxifloxacin (TAN top)  Place 1 drop 4 times daily    Atropine (RED top)  Place 1 drop 2 times daily    GLAUCOMA DROPS:  Drug Eye Top Color Breakfast Lunch Dinner Bedtime   Alphagan:  Brimonidine BOTH Purple X X X    Cosopt:  Dorzolamide/  Timolol BOTH Blue X  X    Xalatan:  Latanoprost RIGHT Teal    X     WAIT 5 MINUTES BETWEEN DROPS. THE ORDER OF THE DROPS DOES NOT MATTER.    No blood thinners  NO STRENUOUS ACTIVITY! No heavy lifting, bending or straining for 10 days  Do not rub your eyes for 1 month  Do not get water in your eyes for 1 week  No swimming for 1 month  Please sleep with the shield over your eye for the next week to protect your eye during sleep    If you experience any increasing redness, sensitivity to light, pain, or changes in vision, please call the office immediately.    Bibi Jara MD  Office number: [phone]

## 2024-12-24 ENCOUNTER — OFFICE VISIT (OUTPATIENT)
Dept: OPHTHALMOLOGY | Facility: CLINIC | Age: 79
End: 2024-12-24
Payer: OTHER GOVERNMENT

## 2024-12-24 DIAGNOSIS — H40.1131 PRIMARY OPEN ANGLE GLAUCOMA (POAG) OF BOTH EYES, MILD STAGE: Primary | ICD-10-CM

## 2024-12-24 PROCEDURE — 99024 POSTOP FOLLOW-UP VISIT: CPT | Mod: ,,, | Performed by: STUDENT IN AN ORGANIZED HEALTH CARE EDUCATION/TRAINING PROGRAM

## 2024-12-24 PROCEDURE — 99999 PR PBB SHADOW E&M-EST. PATIENT-LVL IV: CPT | Mod: PBBFAC,,, | Performed by: STUDENT IN AN ORGANIZED HEALTH CARE EDUCATION/TRAINING PROGRAM

## 2024-12-24 PROCEDURE — 99214 OFFICE O/P EST MOD 30 MIN: CPT | Mod: PBBFAC | Performed by: STUDENT IN AN ORGANIZED HEALTH CARE EDUCATION/TRAINING PROGRAM

## 2024-12-24 RX ORDER — PREDNISONE 20 MG/1
TABLET ORAL
Qty: 11 TABLET | Refills: 0 | Status: SHIPPED | OUTPATIENT
Start: 2024-12-24 | End: 2025-01-07

## 2024-12-24 NOTE — PROGRESS NOTES
Assessment:  1. Primary open angle glaucoma (POAG) of both eyes, mild stage  - Synopsis: [facility] s/p SLT OU. Per referral note, recent IOP OS 37. PMH hx prostate cancer, CML, HTN.  - Surg hx: none   - Laser hx: SLT x 2 OU  - Glaucoma FHx: none  -  / 577  - Gonio: open/ with inferior PAS OU (Estefani 5/2023)  - Tmax: unknown OD, 37 per outside records OS  - Target IOP: not yet determined  - Med adverse effects: n/a    Baseline HVF 5/2023 -- VFI 97/95  Baseline RNFL 5/2023 -- avg 79/85  Baseline photos pending    Last imaging:  HVF: reliable, non-specific, VFI 99 OD  borderline reliable, early SNS, VFI 95 OS -- stable c/w 5/2023  OCT RNFL: blunted ST/IT peak, avg 83 OD  ST/IT thinning, avg 74 OS -- stable OD, worsened OS    12/24/2024  POW#1 s/p Xen with MMC OS 12/19/24  At end of surgery, chamber formed with no hyphema  - POD#1, hyphema with shallow AC -- declines straining? Possibly with extubation?? B-scan without choroidals- ?aqueous misdirection? Vs straining or pressure on eye  - POW#1, choroidals with IOP 15 - monitor    2. Pseudophakia of both eyes  S/p phaco/IOL/goniotomy 4 clock hours OD 10/5/23  S/p phaco/IOL/goniotomy 4 clock hours OS 7/20/23  Uncorrected cyl, declined toric    Plan:  POW#1 s/p Xen with MMC OS 12/19/24  - CHANGE to Cosopt 2/2, Xal 1/0, Brim 3/0  - HOLD MZM 50 mg PO BID   - Continue PF 0/q2h, Moxi 0/4, Atropine 0/2  - Start Prednisone 20 mg PO QD    Today's visit is associated with current and anticipated ongoing medical care related to this patient's single serious/complex condition (glaucoma). Follow up is to be continued indefinitely to monitor the condition.    Return 1 week -- B-scan, [facility], [facility], IOP, sooner PRN    Bibi Jara MD  [facility], Glaucoma

## 2024-12-24 NOTE — PATIENT INSTRUCTIONS
POST OP DROPS:    NEW PILL:  Prednisone 20 mg tablet until next visit    In the LEFT eye:  Prednisolone Acetate (PINK or WHITE top)  SHAKE then place 1 drop every 2 hours while awake    Moxifloxacin (TAN top)  Place 1 drop 4 times daily for 3 more days then stop (end date: 12/27/24)    Atropine (RED top)  Place 1 drop 2 times daily    GLAUCOMA DROPS:  Drug Eye Top Color Breakfast Lunch Dinner Bedtime   Alphagan:  Brimonidine RIGHT Purple X  X    Cosopt:  Dorzolamide/  Timolol BOTH Blue X  X    Xalatan:  Latanoprost RIGHT Teal    X     WAIT 5 MINUTES BETWEEN DROPS. THE ORDER OF THE DROPS DOES NOT MATTER.    No blood thinners, Tylenol only for pain  No heavy lifting, bending or straining for 10 days  Do not rub your eyes for 1 month  Do not get water in your eyes for 1 week  No swimming for 1 month  Please sleep with the shield over your eye for the next week to protect your eye during sleep    If you experience any increasing redness, sensitivity to light, pain, or changes in vision, please call the office immediately.    Bibi Jara MD  Office number: [phone]

## 2025-03-13 ENCOUNTER — OFFICE VISIT (OUTPATIENT)
Dept: OPHTHALMOLOGY | Facility: CLINIC | Age: 80
End: 2025-03-13
Payer: OTHER GOVERNMENT

## 2025-03-13 DIAGNOSIS — Z96.1 PSEUDOPHAKIA OF BOTH EYES: ICD-10-CM

## 2025-03-13 DIAGNOSIS — H40.1131 PRIMARY OPEN ANGLE GLAUCOMA (POAG) OF BOTH EYES, MILD STAGE: Primary | ICD-10-CM

## 2025-03-13 PROCEDURE — 99212 OFFICE O/P EST SF 10 MIN: CPT | Mod: PBBFAC | Performed by: STUDENT IN AN ORGANIZED HEALTH CARE EDUCATION/TRAINING PROGRAM

## 2025-03-13 PROCEDURE — 99999 PR PBB SHADOW E&M-EST. PATIENT-LVL II: CPT | Mod: PBBFAC,,, | Performed by: STUDENT IN AN ORGANIZED HEALTH CARE EDUCATION/TRAINING PROGRAM

## 2025-03-13 PROCEDURE — 99024 POSTOP FOLLOW-UP VISIT: CPT | Mod: ,,, | Performed by: STUDENT IN AN ORGANIZED HEALTH CARE EDUCATION/TRAINING PROGRAM

## 2025-03-13 RX ORDER — LATANOPROST 50 UG/ML
1 SOLUTION/ DROPS OPHTHALMIC NIGHTLY
COMMUNITY
End: 2025-03-13 | Stop reason: SDUPTHER

## 2025-03-13 RX ORDER — LATANOPROST 50 UG/ML
1 SOLUTION/ DROPS OPHTHALMIC NIGHTLY
Qty: 2.5 ML | Refills: 6 | Status: SHIPPED | OUTPATIENT
Start: 2025-03-13

## 2025-03-13 RX ORDER — DORZOLAMIDE HYDROCHLORIDE AND TIMOLOL MALEATE 20; 5 MG/ML; MG/ML
1 SOLUTION/ DROPS OPHTHALMIC 2 TIMES DAILY
Qty: 10 ML | Refills: 6 | Status: SHIPPED | OUTPATIENT
Start: 2025-03-13

## 2025-03-13 RX ORDER — BRIMONIDINE TARTRATE 2 MG/ML
1 SOLUTION/ DROPS OPHTHALMIC 3 TIMES DAILY
Qty: 15 ML | Refills: 6 | Status: SHIPPED | OUTPATIENT
Start: 2025-03-13

## 2025-03-13 NOTE — PROGRESS NOTES
Assessment:  1. Primary open angle glaucoma (POAG) of both eyes, mild stage  - Synopsis: [facility] s/p SLT OU. Per referral note, recent IOP OS 37. PMH hx prostate cancer, CML, HTN.  - Surg hx: none   - Laser hx: SLT x 2 OU  - Glaucoma FHx: none  -  / 577  - Gonio: open/ with inferior PAS OU (Estefani 5/2023)  - Tmax: unknown OD, 37 per outside records OS  - Target IOP: not yet determined  - Med adverse effects: n/a    Baseline HVF 5/2023 -- VFI 97/95  Baseline RNFL 5/2023 -- avg 79/85  Baseline photos pending    Last imaging:  HVF: reliable, non-specific, VFI 99 OD  borderline reliable, early SNS, VFI 95 OS -- stable c/w 5/2023  OCT RNFL: blunted ST/IT peak, avg 83 OD  ST/IT thinning, avg 74 OS -- stable OD, worsened OS    03/13/2025  S/p Xen with MMC OS 12/19/24  No showed after POW#1, LTFU from 12/24/25 - 3/13/25, ran out of PF early Feb 2025  Bleb still functional but does look thick, may require needling with 5-FU  Optimize IOP with drops for now    2. Pseudophakia of both eyes  S/p phaco/IOL/goniotomy 4 clock hours OD 10/5/23  S/p phaco/IOL/goniotomy 4 clock hours OS 7/20/23  Uncorrected cyl, declined toric    Plan:  - CHANGE to Cosopt 2/2, Xal qhs/qhs, Brim 3/3  - HOLD MZM 50 mg PO BID for now (was not taking)    Today's visit is associated with current and anticipated ongoing medical care related to this patient's single serious/complex condition (glaucoma). Follow up is to be continued indefinitely to monitor the condition.    Return 6 weeks -- [facility], IOP, sooner PRN    Bibi Jara MD  [facility], Glaucoma

## 2025-05-02 ENCOUNTER — OFFICE VISIT (OUTPATIENT)
Dept: OPHTHALMOLOGY | Facility: CLINIC | Age: 80
End: 2025-05-02
Payer: OTHER GOVERNMENT

## 2025-05-02 DIAGNOSIS — Z96.1 PSEUDOPHAKIA OF BOTH EYES: ICD-10-CM

## 2025-05-02 DIAGNOSIS — H40.1131 PRIMARY OPEN ANGLE GLAUCOMA (POAG) OF BOTH EYES, MILD STAGE: Primary | ICD-10-CM

## 2025-05-02 PROCEDURE — 99999 PR PBB SHADOW E&M-EST. PATIENT-LVL III: CPT | Mod: PBBFAC,,, | Performed by: STUDENT IN AN ORGANIZED HEALTH CARE EDUCATION/TRAINING PROGRAM

## 2025-05-02 PROCEDURE — 99213 OFFICE O/P EST LOW 20 MIN: CPT | Mod: PBBFAC | Performed by: STUDENT IN AN ORGANIZED HEALTH CARE EDUCATION/TRAINING PROGRAM

## 2025-05-02 RX ORDER — METHAZOLAMIDE 50 MG/1
50 TABLET ORAL 3 TIMES DAILY
Qty: 90 TABLET | Refills: 11 | Status: SHIPPED | OUTPATIENT
Start: 2025-05-02

## 2025-05-02 NOTE — PROGRESS NOTES
Assessment:  1. Primary open angle glaucoma (POAG) of both eyes, mild stage  - Synopsis: [facility] s/p SLT OU. Per referral note, recent IOP OS 37. PMH hx prostate cancer, CML, HTN.  - Surg hx:    Phaco/IOL/goniotomy 10/5/23 Coulon   Phaco/IOL/goniotomy 7/20/23 Coulon   Xen OS 12/19/24 Coulon  - Laser hx: SLT x 2 OU  - Glaucoma FHx: none  -  / 577  - Gonio: open/ with inferior PAS OU (Coulon 5/2023)  - Tmax: unknown OD, 37 per outside records OS  - Target IOP: not yet determined  - Med adverse effects: n/a    Baseline HVF 5/2023 -- VFI 97/95  Baseline RNFL 5/2023 -- avg 79/85  Baseline photos pending    Last imaging:  HVF: reliable, non-specific, VFI 99 OD  borderline reliable, early SNS, VFI 95 OS -- stable c/w 5/2023  OCT RNFL: blunted ST/IT peak, avg 83 OD  ST/IT thinning, avg 74 OS -- stable OD, worsened OS    05/02/2025  S/p Xen with MMC OS 12/19/24  No showed after POW#1, LTFU from 12/24/25 - 3/13/25, ran out of PF early Feb 2025  Bleb looks like it has failed/thick, may need OR revision vs pivot to GDI given difficulty with f/u post op    2. Pseudophakia of both eyes  S/p phaco/IOL/goniotomy 4 clock hours OD 10/5/23  S/p phaco/IOL/goniotomy 4 clock hours OS 7/20/23  Uncorrected cyl, declined toric    Plan:  IOP above goal OS with worsened imaging. Did Xen OS 12/2024 with poor post op follow up -- now bleb looks like it has failed. May need surgical revision.  - Continue Cosopt 2/2, Rock qhs/qhs, Brim 3/3  - RESTART MZM 50 mg PO TID    Today's visit is associated with current and anticipated ongoing medical care related to this patient's single serious/complex condition (glaucoma). Follow up is to be continued indefinitely to monitor the condition.    Return 1 month [place] -- IOP  May need bleb revision vs GDI OS (hx poor follow up post op Xen due to snow storms/transport)    Bibi Jara MD  [facility], Glaucoma

## 2025-06-05 ENCOUNTER — OFFICE VISIT (OUTPATIENT)
Dept: OPHTHALMOLOGY | Facility: CLINIC | Age: 80
End: 2025-06-05
Payer: OTHER GOVERNMENT

## 2025-06-05 DIAGNOSIS — Z96.1 STATUS POST CATARACT EXTRACTION AND INSERTION OF INTRAOCULAR LENS, LEFT: ICD-10-CM

## 2025-06-05 DIAGNOSIS — Z98.42 STATUS POST CATARACT EXTRACTION AND INSERTION OF INTRAOCULAR LENS, LEFT: ICD-10-CM

## 2025-06-05 DIAGNOSIS — H40.1131 PRIMARY OPEN ANGLE GLAUCOMA (POAG) OF BOTH EYES, MILD STAGE: Primary | ICD-10-CM

## 2025-06-05 DIAGNOSIS — Z98.41 STATUS POST CATARACT EXTRACTION AND INSERTION OF INTRAOCULAR LENS, RIGHT: ICD-10-CM

## 2025-06-05 DIAGNOSIS — Z96.1 STATUS POST CATARACT EXTRACTION AND INSERTION OF INTRAOCULAR LENS, RIGHT: ICD-10-CM

## 2025-06-05 DIAGNOSIS — Z96.1 PSEUDOPHAKIA OF BOTH EYES: ICD-10-CM

## 2025-06-05 PROCEDURE — 99214 OFFICE O/P EST MOD 30 MIN: CPT | Mod: PBBFAC | Performed by: OPHTHALMOLOGY

## 2025-06-05 PROCEDURE — 99999 PR PBB SHADOW E&M-EST. PATIENT-LVL IV: CPT | Mod: PBBFAC,,, | Performed by: OPHTHALMOLOGY

## (undated) DEVICE — KNIFE ANGLE 1MM

## (undated) DEVICE — DRAPE STERI INCISE MED 130X130

## (undated) DEVICE — DRESSING TRANS 4X4 TEGADERM

## (undated) DEVICE — SOL WATER STRL IRR 1000ML

## (undated) DEVICE — HYDRODISSECTOR NUCLEUS 27GX7/8

## (undated) DEVICE — SPONGE WEC CEL SPEARS

## (undated) DEVICE — GLOVE BIOGEL PI MICRO SZ 6.5

## (undated) DEVICE — SYS OMNI GLAUCOMA TREATMENT

## (undated) DEVICE — SHIELD EYE PLASTIC 3100G

## (undated) DEVICE — PACK CATARACT OPTHALMIC CUSTOM

## (undated) DEVICE — SOL BETADINE 5%

## (undated) DEVICE — GOWN SURGICAL X-LARGE

## (undated) DEVICE — BLADES 25GA MVR

## (undated) DEVICE — CANNULA 23 GA

## (undated) DEVICE — KIT MEROCEL INSTRUMENT WIPE

## (undated) DEVICE — SHIELD COLLAGEN 12HR CORNEAL

## (undated) DEVICE — NDL FLTR 5MCRN BLNT TIP 18GX1

## (undated) DEVICE — KIT GREY EYE

## (undated) DEVICE — SUT CTD VICRYL 7-0 TG1408T

## (undated) DEVICE — DRAPE OPHTHALMIC 48X62 FEN

## (undated) DEVICE — DRAPE THREE-QTR REINF 53X77IN

## (undated) DEVICE — GARTER EYE ADULT

## (undated) DEVICE — SET COLLECTION 23G BUTTERFLY

## (undated) DEVICE — SYR 3CC LUER LOC

## (undated) DEVICE — NDL BOX COUNTER

## (undated) DEVICE — DRAPE STERI-DRAPE APERTURE

## (undated) DEVICE — BLADE KAHOOK DUAL